# Patient Record
Sex: MALE | Race: WHITE | Employment: OTHER | URBAN - METROPOLITAN AREA
[De-identification: names, ages, dates, MRNs, and addresses within clinical notes are randomized per-mention and may not be internally consistent; named-entity substitution may affect disease eponyms.]

---

## 2017-02-15 NOTE — TELEPHONE ENCOUNTER
Pt called for eliquis refill on 1-10 via mailorder. Mail order has not received the refill order. Pt would like a 90 day supply thru mail order and a few pills to [facility] [phone]    Please call pt when refills are sent in.     Thanks

## 2017-02-16 NOTE — TELEPHONE ENCOUNTER
Patient is going out of town tomorrow and needs Eliquis sent to [facility] , and also to his mail order for 90 day supply .       Thanks     [employer]

## 2017-03-20 RX ORDER — DILTIAZEM HYDROCHLORIDE 180 MG/1
CAPSULE, COATED, EXTENDED RELEASE ORAL
Qty: 90 CAP | Refills: 2 | Status: SHIPPED | OUTPATIENT
Start: 2017-03-20 | End: 2018-01-15 | Stop reason: SDUPTHER

## 2017-04-07 ENCOUNTER — OFFICE VISIT (OUTPATIENT)
Dept: CARDIOLOGY CLINIC | Age: 72
End: 2017-04-07

## 2017-04-07 VITALS
RESPIRATION RATE: 16 BRPM | WEIGHT: 167.6 LBS | HEART RATE: 67 BPM | DIASTOLIC BLOOD PRESSURE: 60 MMHG | BODY MASS INDEX: 23.46 KG/M2 | OXYGEN SATURATION: 97 % | SYSTOLIC BLOOD PRESSURE: 118 MMHG | HEIGHT: 71 IN

## 2017-04-07 DIAGNOSIS — I48.92 ATRIAL FLUTTER, UNSPECIFIED TYPE (HCC): Chronic | ICD-10-CM

## 2017-04-07 DIAGNOSIS — I35.0 AORTIC STENOSIS, MILD: ICD-10-CM

## 2017-04-07 DIAGNOSIS — E78.2 MIXED HYPERLIPIDEMIA: ICD-10-CM

## 2017-04-07 DIAGNOSIS — I48.0 PAF (PAROXYSMAL ATRIAL FIBRILLATION) (HCC): Primary | ICD-10-CM

## 2017-04-07 NOTE — PROGRESS NOTES
NAME:  Tarun Pratt   :   1945   MRN:   [id number]   PCP:  Adebayo Mathias MD           Subjective: The patient is a 70y.o. year old male  who returns for a routine follow-up on PAF. Since the last visit, patient reports no change in exercise tolerance, chest pain, edema, medication intolerance, palpitations, shortness of breath, PND/orthopnea wheezing, sputum, syncope, dizziness or light headedness. Doing well. Works out 3 x a week without any complaints. Past Medical History:   Diagnosis Date    Anemia 2011    Arrhythmia     A-flutter    Atrial fibrillation (HCC)     Atrial flutter (HCC)     BPH (benign prostatic hyperplasia) 2011    Colon polyps 3/13/2015    Tubular adenomas    Essential hypertension     Hyperlipidemia 2011    Long term current use of anticoagulant therapy     Other acute and subacute form of ischemic heart disease     Other ill-defined conditions     fractured ribs     Palpitations        Social History   Substance Use Topics    Smoking status: Former Smoker     Quit date: 1985    Smokeless tobacco: Never Used    Alcohol use 7.0 oz/week     14 Glasses of wine per week      Family History   Problem Relation Age of Onset    Heart Disease Mother     Cancer Sister     Cancer Sister         Review of Systems  Constitutional: Negative for fever, chills, and diaphoresis. Respiratory: Negative for cough, hemoptysis, sputum production, shortness of breath and wheezing. Cardiovascular: Negative for chest pain, palpitations, orthopnea, claudication, leg swelling and PND. Gastrointestinal: Negative for heartburn, nausea, vomiting, blood in stool and melena. Genitourinary: Negative for dysuria and flank pain. Musculoskeletal: Negative for joint pain and back pain. Skin: Negative for rash. Neurological: Negative for focal weakness, seizures, loss of consciousness, weakness and headaches.    Endo/Heme/Allergies: Does not bruise/bleed easily. Psychiatric/Behavioral: Negative for memory loss. The patient does not have insomnia. Objective:       Vitals:    04/07/17 1005 04/07/17 1011   BP: 124/60 118/60   Pulse: 67    Resp: 16    SpO2: 97%    Weight: 167 lb 9.6 oz (76 kg)    Height: 5' 11\" (1.803 m)     Body mass index is 23.38 kg/(m^2). General PE    Gen: NAD     Mental Status - Alert. General Appearance - Not in acute distress. Neck - no JVD     Chest and Lung Exam     Inspection: Accessory muscles - No use of accessory muscles in breathing. Auscultation:   Breath sounds: - Normal.     Cardiovascular   Inspection: Jugular vein - Bilateral - Inspection Normal.   Palpation/Percussion:   Apical Impulse: - Normal.   Auscultation: Rhythm - Regular. Heart Sounds - S1 WNL and S2 WNL. No S3 or S4. Murmurs & Other Heart Sounds: Auscultation of the heart reveals - +MANSOOR     Peripheral Vascular   Upper Extremity: Inspection - Bilateral - No Cyanotic nailbeds or Digital clubbing. Lower Extremity:   Palpation: Edema - Bilateral - No edema. Abdomen: Soft, non-tender, bowel sounds are active. Neuro: A&O times 3, CN and motor grossly WNL      Data Review:     EKG -  Sinus  Rhythm   -Poor R-wave progression -nonspecific     Allergies reviewed  No Known Allergies    Medications reviewed  Current Outpatient Prescriptions   Medication Sig    dilTIAZem CD (CARDIZEM CD) 180 mg ER capsule TAKE 1 CAPSULE DAILY    apixaban (ELIQUIS) 5 mg tablet Take 1 Tab by mouth every twelve (12) hours. Replaces Xarelto 2/10/2016- please start after current Xarelto complete    flecainide (TAMBOCOR) 100 mg tablet TAKE 1 TABLET TWICE A DAY    losartan (COZAAR) 25 mg tablet TAKE 1 TABLET DAILY    atorvastatin (LIPITOR) 10 mg tablet TAKE 1 TABLET DAILY    montelukast (SINGULAIR) 10 mg tablet TAKE 1 TABLET DAILY    ciprofloxacin HCl (CIPRO) 500 mg tablet TAKE 1 TABLET BY MOUTH 2 TIMES A DAY.     alfuzosin SR (UROXATRAL) 10 mg SR tablet Take 10 mg by mouth daily.  tadalafil (CIALIS) 5 mg tablet Take 5 mg by mouth daily.  ergocalciferol, vitamin d2, (VITAMIN D) 400 unit Cap Take  by mouth.  coenzyme q10 (CO Q-10) 10 mg Cap Take  by mouth daily.  dutaseride (AVODART) 0.5 mg capsule Take 0.5 mg by mouth daily.  doxycycline (VIBRA-TABS) 100 mg tablet Take 1 Tab by mouth as needed. No current facility-administered medications for this visit. Assessment:       ICD-10-CM ICD-9-CM    1. PAF (paroxysmal atrial fibrillation) ([facility]) I48.0 427.31 AMB POC EKG ROUTINE W/ 12 LEADS, INTER & REP      LIPID PANEL      CK      METABOLIC PANEL, COMPREHENSIVE   2. Atrial flutter, unspecified type ([address] I48.92 427.32    3. Mixed hyperlipidemia E78.2 272.2 LIPID PANEL      CK      METABOLIC PANEL, COMPREHENSIVE   4. Normal coronary arteries Z03.89 V71.7    5. Aortic stenosis, mild I35.0 424.1         Orders Placed This Encounter    LIPID PANEL    CK    METABOLIC PANEL, COMPREHENSIVE    AMB POC EKG ROUTINE W/ 12 LEADS, INTER & REP     Order Specific Question:   Reason for Exam:     Answer:   routine           Plan:     Patient presents for follow up, feeling well and stable from cardiac standpoint. PAF:  ECG remains SR on Flecainide/ Eliquis.     Mild AS 7/15:  F/u 3 years later if no symptoms/ change in murmur.     BP is normotensive.      Lipids were at goal in March 2016, due now-ordered.       Exercising-congratulated and encouraged continuation.     Continue current care and f/u in 6 months.     Chelsey Ozuna MD

## 2017-04-07 NOTE — MR AVS SNAPSHOT
Visit Information Date & Time Provider Department Dept. Phone Encounter #  
 4/7/2017  9:45 AM Chelsey Ozuna, [address] [facility] [phone] Upcoming Health Maintenance Date Due Hepatitis C Screening 1945 MEDICARE YEARLY EXAM 10/3/2010 INFLUENZA AGE 9 TO ADULT 8/1/2016 Pneumococcal 65+ Low/Medium Risk (2 of 2 - PPSV23) 10/30/2016 COLONOSCOPY 3/9/2018 GLAUCOMA SCREENING Q2Y 3/31/2018 DTaP/Tdap/Td series (2 - Td) 7/19/2018 Allergies as of 4/7/2017  Review Complete On: 4/7/2017 By: Chelsey Ozuna MD  
 No Known Allergies Current Immunizations  Reviewed on 3/7/2012 Name Date Pneumococcal Conjugate (PCV-13) 10/30/2015 Pneumococcal Vaccine (Unspecified Type) 7/19/2010, 7/19/1997 TD Vaccine 7/19/2004 TDAP Vaccine 7/19/2008 Zoster 7/19/2007 Not reviewed this visit You Were Diagnosed With   
  
 Codes Comments PAF (paroxysmal atrial fibrillation) ([facility]    -  Primary ICD-10-CM: I48.0 ICD-9-CM: 427.31 At[facility] fl[facility], unspecified [facility] ([facility]     ICD-10-CM: I48.92 
ICD-9-CM: 427.32 Mixed hyperlipidemia     ICD-10-CM: E78.2 ICD-9-CM: 272.2 Vitals BP Pulse Resp Height(growth percentile) Weight(growth percentile) SpO2  
 118/60 (BP 1 Location: Right arm, BP Patient Position: Sitting) 67 16 5' 11\" (1.803 m) 167 lb 9.6 oz (76 kg) 97% BMI Smoking Status 23.38 kg/m2 Former Smoker Vitals History BMI and BSA Data Body Mass Index Body Surface Area  
 23.38 kg/m 2 1.95 m 2 Preferred Pharmacy Pharmacy Name Phone  [address] [phone] Your Updated Medication List  
  
   
This list is accurate as of: 4/7/17 10:39 AM.  Always use your most recent med list.  
  
  
  
  
 alfuzosin SR 10 mg SR tablet Commonly known as:  Cindy Chang Take 10 mg by mouth daily. apixaban 5 mg tablet Commonly known as:  Pastshaolnda Ramal Take 1 Tab by mouth every twelve (12) hours. Replaces Xarelto 2/10/2016- please start after current Xarelto complete  
  
 atorvastatin 10 mg tablet Commonly known as:  LIPITOR  
TAKE 1 TABLET DAILY  
  
 AVODART 0.5 mg capsule Generic drug:  dutasteride Take 0.5 mg by mouth daily. CIALIS 5 mg tablet Generic drug:  tadalafil Take 5 mg by mouth daily. ciprofloxacin HCl 500 mg tablet Commonly known as:  CIPRO TAKE 1 TABLET BY MOUTH 2 TIMES A DAY. CO Q-10 10 mg Cap Generic drug:  coenzyme q10 Take  by mouth daily. dilTIAZem  mg ER capsule Commonly known as:  CARDIZEM CD  
TAKE 1 CAPSULE DAILY  
  
 doxycycline 100 mg tablet Commonly known as:  VIBRA-TABS Take 1 Tab by mouth as needed. flecainide 100 mg tablet Commonly known as:  TAMBOCOR  
TAKE 1 TABLET TWICE A DAY  
  
 losartan 25 mg tablet Commonly known as:  COZAAR  
TAKE 1 TABLET DAILY  
  
 montelukast 10 mg tablet Commonly known as:  SINGULAIR  
TAKE 1 TABLET DAILY  
  
 VITAMIN D2 400 unit Cap Generic drug:  ergocalciferol (vitamin d2) Take  by mouth. We Performed the Following AMB POC EKG ROUTINE W/ 12 LEADS, INTER & REP [06981 CPT(R)] CK [id number] CPT(R)] LIPID PANEL [47284 CPT(R)] METABOLIC PANEL, COMPREHENSIVE [89733 CPT(R)] Introducing [facility]! Johnna Gaston introduces [employer] patient portal. Now you can access parts of your medical record, email your doctor's office, and request medication refills online. 1. In your internet browser, go to [email or web address] 2. Click on the First Time User? Click Here link in the Sign In box. You will see the New Member Sign Up page. 3. Enter your [employer] Access Code exactly as it appears below. You will not need to use this code after youve completed the sign-up process. If you do not sign up before the expiration date, you must request a new code. · [employer] Access Code: [email or web address] Expires: 7/6/2017 10:39 AM 
 
4. Enter the last four digits of your Social Security Number (xxxx) and Date of Birth (mm/dd/yyyy) as indicated and click Submit. You will be taken to the next sign-up page. 5. Create a [employer] ID. This will be your [employer] login ID and cannot be changed, so think of one that is secure and easy to remember. 6. Create a [employer] password. You can change your password at any time. 7. Enter your Password Reset Question and Answer. This can be used at a later time if you forget your password. 8. Enter your e-mail address. You will receive e-mail notification when new information is available in [address] 9. Click Sign Up. You can now view and download portions of your medical record. 10. Click the Download Summary menu link to download a portable copy of your medical information. If you have questions, please visit the Frequently Asked Questions section of the [employer] website. Remember, [employer] is NOT to be used for urgent needs. For medical emergencies, dial 911. Now available from your iPhone and Android! Please provide this summary of care documentation to your next provider. Your primary care clinician is listed as LEONARDO Aaron. If you have any questions after today's visit, please call [phone].

## 2017-06-06 ENCOUNTER — TELEPHONE (OUTPATIENT)
Dept: CARDIOLOGY CLINIC | Age: 72
End: 2017-06-06

## 2017-06-06 NOTE — TELEPHONE ENCOUNTER
[facility] calling requesting cardiac clearance and holding blood thinner prior to procedure please advise thanks.  Fax # [phone] Att: Polina Kidd with  Dr Pao Dorsey

## 2017-06-09 ENCOUNTER — TELEPHONE (OUTPATIENT)
Dept: CARDIOLOGY CLINIC | Age: 72
End: 2017-06-09

## 2017-06-09 RX ORDER — FLECAINIDE ACETATE 100 MG/1
TABLET ORAL
Qty: 180 TAB | Refills: 3 | Status: SHIPPED | OUTPATIENT
Start: 2017-06-09 | End: 2018-04-26 | Stop reason: SDUPTHER

## 2017-06-09 NOTE — TELEPHONE ENCOUNTER
Patient needs a refill for his mail order flecainide 100 mg.  He asked if you could call him,    Thanks

## 2017-06-22 RX ORDER — ATORVASTATIN CALCIUM 10 MG/1
TABLET, FILM COATED ORAL
Qty: 90 TAB | Refills: 2 | Status: SHIPPED | OUTPATIENT
Start: 2017-06-22 | End: 2018-03-24 | Stop reason: SDUPTHER

## 2017-08-31 RX ORDER — LOSARTAN POTASSIUM 25 MG/1
TABLET ORAL
Qty: 90 TAB | Refills: 3 | Status: SHIPPED | OUTPATIENT
Start: 2017-08-31 | End: 2018-08-29 | Stop reason: SDUPTHER

## 2017-11-16 RX ORDER — APIXABAN 5 MG/1
TABLET, FILM COATED ORAL
Qty: 180 TAB | Refills: 2 | Status: SHIPPED | OUTPATIENT
Start: 2017-11-16 | End: 2017-11-20 | Stop reason: SDUPTHER

## 2017-11-20 ENCOUNTER — OFFICE VISIT (OUTPATIENT)
Dept: CARDIOLOGY CLINIC | Age: 72
End: 2017-11-20

## 2017-11-20 VITALS
BODY MASS INDEX: 23.38 KG/M2 | SYSTOLIC BLOOD PRESSURE: 128 MMHG | DIASTOLIC BLOOD PRESSURE: 62 MMHG | HEART RATE: 94 BPM | RESPIRATION RATE: 18 BRPM | OXYGEN SATURATION: 97 % | WEIGHT: 167 LBS | HEIGHT: 71 IN

## 2017-11-20 DIAGNOSIS — I35.0 AORTIC STENOSIS, MILD: ICD-10-CM

## 2017-11-20 DIAGNOSIS — I48.92 ATRIAL FLUTTER, UNSPECIFIED TYPE (HCC): Primary | Chronic | ICD-10-CM

## 2017-11-20 DIAGNOSIS — E78.2 MIXED HYPERLIPIDEMIA: ICD-10-CM

## 2017-11-20 NOTE — MR AVS SNAPSHOT
Visit Information Date & Time Provider Department Dept. Phone Encounter #  
 11/20/2017 11:00 AM Kayleigh Contreras, [address] [facility] [phone] [id number] Upcoming Health Maintenance Date Due Hepatitis C Screening 1945 MEDICARE YEARLY EXAM 10/3/2010 Pneumococcal 65+ Low/Medium Risk (2 of 2 - PPSV23) 10/30/2016 COLONOSCOPY 3/9/2018 GLAUCOMA SCREENING Q2Y 3/31/2018 DTaP/Tdap/Td series (2 - Td) 7/19/2018 Allergies as of 11/20/2017  Review Complete On: 11/20/2017 By: Kayleigh Contreras MD  
 No Known Allergies Current Immunizations  Reviewed on 3/7/2012 Name Date Influenza High Dose Vaccine PF 10/2/2017 Pneumococcal Conjugate (PCV-13) 10/30/2015 TD Vaccine 7/19/2004 TDAP Vaccine 7/19/2008 ZZZ-RETIRED (DO NOT USE) Pneumococcal Vaccine (Unspecified Type) 7/19/2010, 7/19/1997 Zoster 7/19/2007 Not reviewed this visit You Were Diagnosed With   
  
 Codes Comments Atrial flutter, unspecified type ([facility]    -  Primary ICD-10-CM: I48.92 
ICD-9-CM: 427.32 Mixed hyperlipidemia     ICD-10-CM: E78.2 ICD-9-CM: 272.2 Aortic stenosis, mild     ICD-10-CM: I35.0 ICD-9-CM: 424.1 Normal coronary arteries     ICD-10-CM: Z03.89 ICD-9-CM: V71.7 Vitals BP Pulse Resp Height(growth percentile) Weight(growth percentile) SpO2  
 128/62 (BP 1 Location: Right arm, BP Patient Position: Standing) 94 18 5' 11\" (1.803 m) 167 lb (75.8 kg) 97% BMI Smoking Status 23.29 kg/m2 Former Smoker Vitals History BMI and BSA Data Body Mass Index Body Surface Area  
 23.29 kg/m 2 1.95 m 2 Preferred Pharmacy Pharmacy Name Phone  [address] [phone] Your Updated Medication List  
  
   
This list is accurate as of: 11/20/17 11:52 AM.  Always use your most recent med list.  
  
  
  
  
 alfuzosin SR 10 mg SR tablet Commonly known as:  Cosimo Skipper Take 10 mg by mouth daily. atorvastatin 10 mg tablet Commonly known as:  LIPITOR  
TAKE 1 TABLET DAILY  
  
 AVODART 0.5 mg capsule Generic drug:  dutasteride Take 0.5 mg by mouth daily. CIALIS 5 mg tablet Generic drug:  tadalafil Take 5 mg by mouth daily. ciprofloxacin HCl 500 mg tablet Commonly known as:  CIPRO TAKE 1 TABLET BY MOUTH 2 TIMES A DAY. CO Q-10 10 mg Cap Generic drug:  coenzyme q10 Take  by mouth daily. dilTIAZem  mg ER capsule Commonly known as:  CARDIZEM CD  
TAKE 1 CAPSULE DAILY  
  
 doxycycline 100 mg tablet Commonly known as:  VIBRA-TABS Take 1 Tab by mouth as needed. ELIQUIS 5 mg tablet Generic drug:  apixaban FOR DIRECTIONS ON HOW TO   TAKE THIS MEDICINE, READ   THE ENCLOSED MEDICATION    INFORMATION FORM  
  
 flecainide 100 mg tablet Commonly known as:  TAMBOCOR  
TAKE 1 TABLET TWICE A DAY  
  
 losartan 25 mg tablet Commonly known as:  COZAAR  
TAKE 1 TABLET DAILY  
  
 montelukast 10 mg tablet Commonly known as:  SINGULAIR  
TAKE 1 TABLET DAILY  
  
 VITAMIN D2 400 unit Cap Generic drug:  ergocalciferol (vitamin d2) Take  by mouth. We Performed the Following AMB POC EKG ROUTINE W/ 12 LEADS, INTER & REP [18851 CPT(R)] To-Do List   
 05/20/2018 ECHO:  2D ECHO COMPLETE ADULT (TTE) W OR WO CONTR Introducing [address]! [employer] [facility] introduces [employer] patient portal. Now you can access parts of your medical record, email your doctor's office, and request medication refills online. 1. In your internet browser, go to [email or web address] 2. Click on the First Time User? Click Here link in the Sign In box. You will see the New Member Sign Up page. 3. Enter your [employer] Access Code exactly as it appears below. You will not need to use this code after youve completed the sign-up process.  If you do not sign up before the expiration date, you must request a new code. · [employer] Access Code: [email or web address] Expires: 12/31/2017 10:22 AM 
 
4. Enter the last four digits of your Social Security Number (xxxx) and Date of Birth (mm/dd/yyyy) as indicated and click Submit. You will be taken to the next sign-up page. 5. Create a [employer] ID. This will be your [employer] login ID and cannot be changed, so think of one that is secure and easy to remember. 6. Create a [employer] password. You can change your password at any time. 7. Enter your Password Reset Question and Answer. This can be used at a later time if you forget your password. 8. Enter your e-mail address. You will receive e-mail notification when new information is available in [address] 9. Click Sign Up. You can now view and download portions of your medical record. 10. Click the Download Summary menu link to download a portable copy of your medical information. If you have questions, please visit the Frequently Asked Questions section of the [employer] website. Remember, [employer] is NOT to be used for urgent needs. For medical emergencies, dial 911. Now available from your iPhone and Android! Please provide this summary of care documentation to your next provider. Your primary care clinician is listed as LEONARDO Sampson. If you have any questions after today's visit, please call [phone].

## 2017-11-20 NOTE — PROGRESS NOTES
1. Have you been to the ER, urgent care clinic since your last visit? Hospitalized since your last visit? YES, AT [place] [address]. 2. Have you seen or consulted any other health care providers outside of the [address] [place] [address] since your last visit? Include any pap smears or colon screening. YES, PCP DR. RICHARDSON. C/O DIZZINESS.

## 2017-11-20 NOTE — PROGRESS NOTES
Alison Samayoa DNP, ANP-BC  Subjective/HPI:     Yahir Black is a 67 y.o. male is here for routine f/u. The patient denies chest pain/ shortness of breath, orthopnea, PND, LE edema, palpitations, syncope, presyncope or fatigue. Patient reports feeling well in his usual state of health with the exception of very slight dizziness with a quick positional change during laying flat and doing core exercises.     Patient Active Problem List   Diagnosis Code    Hyperlipidemia E78.5    BPH (benign prostatic hyperplasia) N40.0    Anemia D64.9    Hallux valgus (acquired) M20.10    Atrial fibrillation with RVR (HCC) I48.91    Normal coronary arteries Z03.89    Atrial flutter (HCC) I48.92    Atrial fibrillation (HCC) I48.91    Mixed hyperlipidemia E78.2    Colon polyps K63.5    Mitral regurgitation I34.0    PAF (paroxysmal atrial fibrillation) (HCC) I48.0    Aortic stenosis, mild I35.0    Irregular heart beat I49.9       PCP Provider  Agustín Pat MD  Past Medical History:   Diagnosis Date    Anemia 7/20/2011    Arrhythmia     A-flutter    Atrial fibrillation (HCC)     Atrial flutter (HCC)     BPH (benign prostatic hyperplasia) 7/19/2011    Colon polyps 3/13/2015    Tubular adenomas    Essential hypertension     Hyperlipidemia 7/19/2011    Long term current use of anticoagulant therapy     Other acute and subacute form of ischemic heart disease     Other ill-defined conditions(799.89) 1996    fractured ribs     Palpitations       Past Surgical History:   Procedure Laterality Date    CARDIAC CATHETERIZATION  2/2/2012         CARDIAC SURG PROCEDURE UNLIST      cardiac cath    HX HERNIA REPAIR  12/07/2016    [facility] WITH MESH    HX ORTHOPAEDIC  2011    left bunionectomy and hammertoe repair    HX OTHER SURGICAL      cardiac ablation 3/7/12    HX TONSILLECTOMY       No Known Allergies   Family History   Problem Relation Age of Onset    Heart Disease Mother     Cancer Sister     Cancer Sister       Current Outpatient Prescriptions   Medication Sig    ELIQUIS 5 mg tablet FOR DIRECTIONS ON HOW TO   TAKE THIS MEDICINE, READ   THE ENCLOSED MEDICATION    INFORMATION FORM    losartan (COZAAR) 25 mg tablet TAKE 1 TABLET DAILY    montelukast (SINGULAIR) 10 mg tablet TAKE 1 TABLET DAILY    atorvastatin (LIPITOR) 10 mg tablet TAKE 1 TABLET DAILY    flecainide (TAMBOCOR) 100 mg tablet TAKE 1 TABLET TWICE A DAY    dilTIAZem CD (CARDIZEM CD) 180 mg ER capsule TAKE 1 CAPSULE DAILY    tadalafil (CIALIS) 5 mg tablet Take 5 mg by mouth daily.  ergocalciferol, vitamin d2, (VITAMIN D) 400 unit Cap Take  by mouth.  coenzyme q10 (CO Q-10) 10 mg Cap Take  by mouth daily.  ciprofloxacin HCl (CIPRO) 500 mg tablet TAKE 1 TABLET BY MOUTH 2 TIMES A DAY.  alfuzosin SR (UROXATRAL) 10 mg SR tablet Take 10 mg by mouth daily.  doxycycline (VIBRA-TABS) 100 mg tablet Take 1 Tab by mouth as needed.  dutaseride (AVODART) 0.5 mg capsule Take 0.5 mg by mouth daily. No current facility-administered medications for this visit. Vitals:    11/20/17 1058 11/20/17 1105 11/20/17 1106   BP: 140/78 140/62 128/62   Pulse: 94     Resp: 18     SpO2: 97%     Weight: 167 lb (75.8 kg)     Height: 5' 11\" (1.803 m)       Social History     Social History    Marital status:      Spouse name: N/A    Number of children: N/A    Years of education: N/A     Occupational History    Not on file. Social History Main Topics    Smoking status: Former Smoker     Quit date: 2/2/1985    Smokeless tobacco: Never Used    Alcohol use 7.0 oz/week     14 Glasses of wine per week    Drug use: No    Sexual activity: Not on file     Other Topics Concern    Not on file     Social History Narrative       I have reviewed the nurses notes, vitals, problem list, allergy list, medical history, family, social history and medications. Review of Symptoms:    General: Pt denies excessive weight gain or loss.  Pt is able to conduct ADL's  HEENT: Denies blurred vision, headaches, epistaxis and difficulty swallowing. Respiratory: Denies shortness of breath, MORALES, wheezing or stridor. Cardiovascular: Denies precordial pain, palpitations, edema or PND  Gastrointestinal: Denies poor appetite, indigestion, abdominal pain or blood in stool  Musculoskeletal: Denies pain or swelling from muscles or joints  Neurologic: Denies tremor, paresthesias, or sensory motor disturbance  Skin: Denies rash, itching or texture change. Physical Exam:      General: Well developed, in no acute distress, cooperative and alert  HEENT: No carotid bruits, no JVD, trach is midline. Neck Supple, PEERL, EOM intact. Heart:  Normal S1/S2 negative S3 or S4. Regular, 2/6 systolic no murmur, gallop or rub.   Respiratory: Clear bilaterally x 4, no wheezing or rales  Abdomen:   Soft, non-tender, no masses, bowel sounds are active.   Extremities:  No edema, normal cap refill, no cyanosis, atraumatic. Neuro: A&Ox3, speech clear, gait stable. Skin: Skin color is normal. No rashes or lesions.  Non diaphoretic  Vascular: 2+ pulses symmetric in all extremities    Cardiographics    ECG: Sinus rhythm  Results for orders placed or performed during the hospital encounter of 03/07/12   EKG, 12 LEAD, INITIAL   Result Value Ref Range    Ventricular Rate 74 BPM    Atrial Rate 74 BPM    P-R Interval 188 ms    QRS Duration 92 ms    Q-T Interval 390 ms    QTC Calculation (Bezet) 432 ms    Calculated P Axis 78 degrees    Calculated R Axis 20 degrees    Calculated T Axis 54 degrees    Diagnosis       Normal sinus rhythm  Possible Left atrial enlargement  When compared with ECG of 09-JAN-2012 10:23,  Criteria for Anterior infarct are no longer present  T wave amplitude has decreased in Anterior leads  Confirmed by Valarie Santana, P.VAyaan ([id number]) on 3/8/2012 11:04:13 AM         Cardiology Labs:  Lab Results   Component Value Date/Time    Cholesterol, total 158 11/29/2012 09:03 AM    HDL Cholesterol 71 11/29/2012 09:03 AM    LDL, calculated 77 11/29/2012 09:03 AM    Triglyceride 52 11/29/2012 09:03 AM       Lab Results   Component Value Date/Time    Sodium 132 04/21/2017 09:15 AM    Potassium 4.6 04/21/2017 09:15 AM    Chloride 92 04/21/2017 09:15 AM    CO2 25 04/21/2017 09:15 AM    Anion gap 7 12/05/2016 08:31 AM    Glucose 88 04/21/2017 09:15 AM    BUN 15 04/21/2017 09:15 AM    Creatinine 0.87 04/21/2017 09:15 AM    BUN/Creatinine ratio 17 04/21/2017 09:15 AM    GFR est  04/21/2017 09:15 AM    GFR est non-AA 87 04/21/2017 09:15 AM    Calcium 9.6 04/21/2017 09:15 AM    Bilirubin, total 0.5 04/21/2017 09:15 AM    AST (SGOT) 28 04/21/2017 09:15 AM    Alk. phosphatase 34 04/21/2017 09:15 AM    Protein, total 6.5 04/21/2017 09:15 AM    Albumin 4.5 04/21/2017 09:15 AM    Globulin 3.3 01/09/2012 07:50 AM    A-G Ratio 2.3 04/21/2017 09:15 AM    ALT (SGPT) 20 04/21/2017 09:15 AM           Assessment:     Assessment:     Diagnoses and all orders for this visit:    1. Atrial flutter, unspecified type (HCC)  -     AMB POC EKG ROUTINE W/ 12 LEADS, INTER & REP  -     2D ECHO COMPLETE ADULT (TTE) W OR WO CONTR; Future    2. Mixed hyperlipidemia  -     2D ECHO COMPLETE ADULT (TTE) W OR WO CONTR; Future    3. Aortic stenosis, mild  -     2D ECHO COMPLETE ADULT (TTE) W OR WO CONTR; Future    4. Normal coronary arteries  -     2D ECHO COMPLETE ADULT (TTE) W OR WO CONTR; Future        ICD-10-CM ICD-9-CM    1. Atrial flutter, unspecified type (HCC) I48.92 427.32 AMB POC EKG ROUTINE W/ 12 LEADS, INTER & REP      2D ECHO COMPLETE ADULT (TTE) W OR WO CONTR   2. Mixed hyperlipidemia E78.2 272.2 2D ECHO COMPLETE ADULT (TTE) W OR WO CONTR   3. Aortic stenosis, mild I35.0 424.1 2D ECHO COMPLETE ADULT (TTE) W OR WO CONTR   4.  Normal coronary arteries Z03.89 V71.7 2D ECHO COMPLETE ADULT (TTE) W OR WO CONTR     Orders Placed This Encounter    AMB POC EKG ROUTINE W/ 12 LEADS, INTER & REP     Order Specific Question: Reason for Exam:     Answer:   routine    2D ECHO COMPLETE ADULT (TTE) W OR WO CONTR     Standing Status:   Future     Standing Expiration Date:   5/20/2018     Order Specific Question:   Reason for Exam:     Answer:   A/S     Order Specific Question:   Contrast Enhancement (Bubble Study, Definity, Optison) may be used if criteria listed in established evidence-based protocol has been identified. Answer:   Yes        Plan:     Patient presents doing well and is stable from cardiac stand point. 1.  History of atrial fib/flutter: Maintaining sinus rhythm with anti-arrhythmic, continue Eliquis. 2.  Hyperlipidemia: Reviewed lipid panel from primary care, elevated HDL, total cholesterol less than 150, LDL not calculated  3. Aortic stenosis: Mild, will perform echocardiogram in 2018 for surveillance (arrange at next visit). Continue current care and f/u in 6 months. Rahul Stout MD    This note was created using voice recognition software. Despite editing, there may be syntax errors.

## 2018-01-15 RX ORDER — DILTIAZEM HYDROCHLORIDE 180 MG/1
CAPSULE, COATED, EXTENDED RELEASE ORAL
Qty: 90 CAP | Refills: 2 | Status: SHIPPED | OUTPATIENT
Start: 2018-01-15 | End: 2019-04-05 | Stop reason: SDUPTHER

## 2018-03-26 RX ORDER — ATORVASTATIN CALCIUM 10 MG/1
TABLET, FILM COATED ORAL
Qty: 90 TAB | Refills: 2 | Status: SHIPPED | OUTPATIENT
Start: 2018-03-26 | End: 2018-12-08 | Stop reason: SDUPTHER

## 2018-04-26 RX ORDER — FLECAINIDE ACETATE 100 MG/1
TABLET ORAL
Qty: 180 TAB | Refills: 3 | Status: SHIPPED | OUTPATIENT
Start: 2018-04-26 | End: 2020-02-06 | Stop reason: SDUPTHER

## 2018-05-16 ENCOUNTER — TELEPHONE (OUTPATIENT)
Dept: CARDIOLOGY CLINIC | Age: 73
End: 2018-05-16

## 2018-05-16 NOTE — TELEPHONE ENCOUNTER
Patient has colonoscopy scheduled 5-21-18 and has been asked to hold Eliquis 2 days prior LV 11-20-17 next visit 5-24-18 please advise thanks

## 2018-05-24 ENCOUNTER — OFFICE VISIT (OUTPATIENT)
Dept: CARDIOLOGY CLINIC | Age: 73
End: 2018-05-24

## 2018-05-24 VITALS
HEIGHT: 71 IN | DIASTOLIC BLOOD PRESSURE: 70 MMHG | SYSTOLIC BLOOD PRESSURE: 130 MMHG | WEIGHT: 167.6 LBS | HEART RATE: 60 BPM | OXYGEN SATURATION: 98 % | BODY MASS INDEX: 23.46 KG/M2 | RESPIRATION RATE: 16 BRPM

## 2018-05-24 DIAGNOSIS — I48.0 PAF (PAROXYSMAL ATRIAL FIBRILLATION) (HCC): Primary | ICD-10-CM

## 2018-05-24 DIAGNOSIS — I35.0 AORTIC STENOSIS, MILD: ICD-10-CM

## 2018-05-24 DIAGNOSIS — E78.2 MIXED HYPERLIPIDEMIA: ICD-10-CM

## 2018-05-24 NOTE — MR AVS SNAPSHOT
[facility] 
[phone] Patient: Oracio Curtis MRN: [id number] PAMELA:[id number] Visit Information Date & Time Provider Department Dept. Phone Encounter #  
 5/24/2018  9:15 AM Pallavi Thompson, [address] [place] [address] [facility] [phone] [id number] Your Appointments 5/31/2018 10:00 AM  
ECHO CARDIOGRAMS 2D with ECHO, [facility] Appt Note: Dr Triston Nix (TTE) W OR WO CONTR [[id number]] (Order [id number]) ,[address] [place] [address]  
[phone] [address] [place] [address]  
  
    
 11/12/2018  9:00 AM  
ESTABLISHED PATIENT with Pallavi Thompson MD  
[facility] [address]  
[phone] [address] [place] [address] Upcoming Health Maintenance Date Due Hepatitis C Screening 1945 Pneumococcal 65+ Low/Medium Risk (2 of 2 - PPSV23) 10/30/2016 MEDICARE YEARLY EXAM 3/14/2018 GLAUCOMA SCREENING Q2Y 3/31/2018 DTaP/Tdap/Td series (2 - Td) 7/19/2018 Influenza Age 5 to Adult 8/1/2018 Allergies as of 5/24/2018  Review Complete On: 5/24/2018 By: Pallavi Thompson MD  
 No Known Allergies Current Immunizations  Reviewed on 3/7/2012 Name Date Influenza High Dose Vaccine PF 10/2/2017 Pneumococcal Conjugate (PCV-13) 10/30/2015 TD Vaccine 7/19/2004 TDAP Vaccine 7/19/2008 ZZZ-RETIRED (DO NOT USE) Pneumococcal Vaccine (Unspecified Type) 7/19/2010, 7/19/1997 Zoster 7/19/2007 Not reviewed this visit You Were Diagnosed With   
  
 Codes Comments PAF [facility]paroxysmal atrial fibrillation) ([facility]    -  Primary ICD-10-CM: I48.0 ICD-9-CM: 427.31 Aortic stenosis, mild     ICD-10-CM: I35.0 ICD-9-CM: 424.1 Mixed hyperlipidemia     ICD-10-CM: E78.2 ICD-9-CM: 272.2 Normal coronary arteries     ICD-10-CM: Z03.89 ICD-9-CM: V71.7 Vitals BP Pulse Resp Height(growth percentile) Weight(growth percentile) SpO2  
 130/70 (BP Patient Position: Sitting) 60 16 5' 11\" (1.803 m) 167 lb 9.6 oz (76 kg) 98% BMI Smoking Status 23.38 kg/m2 Former Smoker BMI and BSA Data Body Mass Index Body Surface Area  
 23.38 kg/m 2 1.95 m 2 Preferred Pharmacy Pharmacy Name Phone  [address] [phone] Your Updated Medication List  
  
   
This list is accurate as of 5/24/18 10:05 AM.  Always use your most recent med list.  
  
  
  
  
 apixaban 5 mg tablet Commonly known as:  Kike Rubio Take 1 Tab by mouth two (2) times a day. atorvastatin 10 mg tablet Commonly known as:  LIPITOR  
TAKE 1 TABLET DAILY CIALIS 5 mg tablet Generic drug:  tadalafil Take 5 mg by mouth daily. CO Q-10 10 mg Cap Generic drug:  coenzyme q10 Take  by mouth daily. dilTIAZem  mg ER capsule Commonly known as:  CARDIZEM CD  
TAKE 1 CAPSULE DAILY. flecainide 100 mg tablet Commonly known as:  TAMBOCOR  
TAKE 1 TABLET TWICE A DAY  
  
 losartan 25 mg tablet Commonly known as:  COZAAR  
TAKE 1 TABLET DAILY  
  
 montelukast 10 mg tablet Commonly known as:  SINGULAIR  
TAKE 1 TABLET DAILY  
  
 VITAMIN D2 400 unit Cap Generic drug:  ergocalciferol (vitamin d2) Take  by mouth. We Performed the Following AMB POC EKG ROUTINE W/ 12 LEADS, INTER & REP [98578 CPT(R)] To-Do List   
 05/24/2018 ECHO:  2D ECHO COMPLETE ADULT (TTE) W OR WO CONTR Introducing \[facility]\"" & HEALTH SERVICES! [employer] introduces [employer] patient portal. Now you can access parts of your medical record, email your doctor's office, and request medication refills online.    
 
1. In your internet browser, go to [email or web address] [employer] 2. Click on the First Time User? Click Here link in the Sign In box. You will see the New Member Sign Up page. 3. Enter your In[employer] Access Code exactly as it appears below. You will not need to use this code after youve completed the sign-up process. If you do not sign up before the expiration date, you must request a new code. · [employer] Access Code: [email or web address] Expires: 8/22/2018 10:02 AM 
 
4. Enter the last four digits of your Social Security Number (xxxx) and Date of Birth (mm/dd/yyyy) as indicated and click Submit. You will be taken to the next sign-up page. 5. Create a [employer] ID. This will be your InfluxDB login ID and cannot be changed, so think of one that is secure and easy to remember. 6. Create a In[employer] password. You can change your password at any time. 7. Enter your Password Reset Question and Answer. This can be used at a later time if you forget your password. 8. Enter your e-mail address. You will receive e-mail notification when new information is available in [address] 9. Click Sign Up. You can now view and download portions of your medical record. 10. Click the Download Summary menu link to download a portable copy of your medical information. If you have questions, please visit the Frequently Asked Questions section of the [employer] website. Remember, In[employer] is NOT to be used for urgent needs. For medical emergencies, dial 911. Now available from your iPhone and Android! Please provide this summary of care documentation to your next provider. Your primary care clinician is listed as LEONARDO Vasquez. If you have any questions after today's visit, please call [phone].

## 2018-05-24 NOTE — PROGRESS NOTES
1. Have you been to the ER, urgent care clinic since your last visit? Hospitalized since your last visit? Yes When: 5/2018 Colonoscopy @ [facility]    2. Have you seen or consulted any other health care providers outside of the [facility] since your last visit? Include any pap smears or colon screening. Yes Urology 3/2018    Patient has no cardiac complaints.

## 2018-05-24 NOTE — PROGRESS NOTES
[facility], [address] [place] [address]  [phone]     Subjective:      Lasha Rae is a 67 y.o. male is here for routine f/u. The patient denies chest pain/ shortness of breath, orthopnea, PND, LE edema, palpitations, syncope, or presyncope.        Patient Active Problem List    Diagnosis Date Noted    Irregular heart beat 09/21/2016    PAF (paroxysmal atrial fibrillation) (HCC) 02/25/2016    Aortic stenosis, mild 02/25/2016    Mitral regurgitation 07/06/2015    Colon polyps 03/13/2015    Atrial fibrillation ([address] 05/17/2012    Mixed hyperlipidemia 05/17/2012    Atrial flutter ([address] 03/06/2012    Normal coronary arteries 02/02/2012    Atrial fibrillation with RVR ([address] 01/09/2012    Hallux valgus (acquired) 01/05/2012    Anemia 07/20/2011    Hyperlipidemia 07/19/2011    BPH (benign prostatic hyperplasia) 07/19/2011      C Thai Rodgers MD  Past Medical History:   Diagnosis Date    Anemia 7/20/2011    Arrhythmia     A-flutter    Atrial fibrillation (HCC)     Atrial flutter (HCC)     BPH (benign prostatic hyperplasia) 7/19/2011    Colon polyps 3/13/2015    Tubular adenomas    Essential hypertension     Hyperlipidemia 7/19/2011    Long term current use of anticoagulant therapy     Other acute and subacute form of ischemic heart disease     Other ill-defined conditions(799.89) 1996    fractured ribs     Palpitations       Past Surgical History:   Procedure Laterality Date    CARDIAC CATHETERIZATION  2/2/2012         CARDIAC SURG PROCEDURE UNLIST      cardiac cath    HX [facility]PA[facility]  [facility]    HX ORTHOPAEDIC  2011    left bunionectomy and hammertoe repair    HX OTHER SURGICAL      cardiac ablation 3/7/12    HX TONSILLECTOMY       No Known Allergies   Family History   Problem Relation Age of Onset    Heart Disease Mother     Cancer Sister     Cancer Sister       Social History     Social History    Marital status:      Spouse name: N/A    Number of children: N/A    Years of education: N/A     Occupational History    Not on file. Social History Main Topics    Smoking status: Former Smoker     Quit date: 2/2/1985    Smokeless tobacco: Never Used    Alcohol use 7.0 oz/week     14 Glasses of wine per week    Drug use: No    Sexual activity: Not on file     Other Topics Concern    Not on file     Social History Narrative      Current Outpatient Prescriptions   Medication Sig    flecainide (TAMBOCOR) 100 mg tablet TAKE 1 TABLET TWICE A DAY    atorvastatin (LIPITOR) 10 mg tablet TAKE 1 TABLET DAILY    dilTIAZem CD (CARDIZEM CD) 180 mg ER capsule TAKE 1 CAPSULE DAILY.  apixaban (ELIQUIS) 5 mg tablet Take 1 Tab by mouth two (2) times a day.  losartan (COZAAR) 25 mg tablet TAKE 1 TABLET DAILY    montelukast (SINGULAIR) 10 mg tablet TAKE 1 TABLET DAILY    tadalafil (CIALIS) 5 mg tablet Take 5 mg by mouth daily.  ergocalciferol, vitamin d2, (VITAMIN D) 400 unit Cap Take  by mouth.  coenzyme q10 (CO Q-10) 10 mg Cap Take  by mouth daily. No current facility-administered medications for this visit. Review of Symptoms:  11 systems reviewed, negative other than as stated in the HPI    Physical ExamPhysical Exam:    Vitals:    05/24/18 0950   BP: 130/70   Pulse: 60   Resp: 16   SpO2: 98%   Weight: 167 lb 9.6 oz (76 kg)   Height: 5' 11\" (1.803 m)     Body mass index is 23.38 kg/(m^2). General PE   Gen:  NAD  Mental Status - Alert. General Appearance - Not in acute distress. Chest and Lung Exam   Inspection: Accessory muscles - No use of accessory muscles in breathing. Auscultation:   Breath sounds: - Normal.   Cardiovascular   Inspection: Jugular vein - Bilateral - Inspection Normal.   Palpation/Percussion:   Apical Impulse: - Normal.   Auscultation: Rhythm - Regular. Heart Sounds - S1 WNL and S2 WNL. No S3 or S4. Murmurs & Other Heart Sounds: Auscultation of the heart reveals - No Murmurs.    Peripheral Vascular   Upper Extremity: Inspection - Bilateral - No Cyanotic nailbeds or Digital clubbing. Lower Extremity:   Palpation: Edema - Bilateral - No edema. Abdomen:   Soft, non-tender, bowel sounds are active. Neuro: A&O times 3, CN and motor grossly WNL    Labs:   Lab Results   Component Value Date/Time    Cholesterol, total 158 11/29/2012 09:03 AM    HDL Cholesterol 71 11/29/2012 09:03 AM    LDL, calculated 77 11/29/2012 09:03 AM    Triglyceride 52 11/29/2012 09:03 AM     Lab Results   Component Value Date/Time     01/09/2012 07:50 AM     Lab Results   Component Value Date/Time    Sodium 132 (L) 04/21/2017 09:15 AM    Potassium 4.6 04/21/2017 09:15 AM    Chloride 92 (L) 04/21/2017 09:15 AM    CO2 25 04/21/2017 09:15 AM    Anion gap 7 12/05/2016 08:31 AM    Glucose 88 04/21/2017 09:15 AM    BUN 15 04/21/2017 09:15 AM    Creatinine 0.87 04/21/2017 09:15 AM    BUN/Creatinine ratio 17 04/21/2017 09:15 AM    GFR est  04/21/2017 09:15 AM    GFR est non-AA 87 04/21/2017 09:15 AM    Calcium 9.6 04/21/2017 09:15 AM    Bilirubin, total 0.5 04/21/2017 09:15 AM    AST (SGOT) 28 04/21/2017 09:15 AM    Alk. phosphatase 34 (L) 04/21/2017 09:15 AM    Protein, total 6.5 04/21/2017 09:15 AM    Albumin 4.5 04/21/2017 09:15 AM    Globulin 3.3 01/09/2012 07:50 AM    A-G Ratio 2.3 (H) 04/21/2017 09:15 AM    ALT (SGPT) 20 04/21/2017 09:15 AM       EKG:  NSR      Assessment:        1. PAF (paroxysmal atrial fibrillation) ([address]    2. Aortic stenosis, mild    3. Mixed hyperlipidemia    4. Normal coronary arteries        Orders Placed This Encounter    AMB POC EKG ROUTINE W/ 12 LEADS, INTER & REP     Order Specific Question:   Reason for Exam:     Answer:   routine        Plan:        Patient presents doing well and is stable from cardiac stand point. 1.  History of atrial fib/flutter: Maintaining sinus rhythm with anti-arrhythmic, continue Eliquis. 2.  Hyperlipidemia: On statin, followed by PCP.   3.  Aortic stenosis: Mild in 2015, will perform 3 year follow-up echocardiogram in the near future. 4.  Counseled on diet and exercise- eventual goal of 30-60 minutes 5-7 times a week as per AHA guidelines. Continues to work with a  TIW on core exercises.      Continue current care and f/u in 6 months.     Dimitri Crump MD

## 2018-05-31 ENCOUNTER — CLINICAL SUPPORT (OUTPATIENT)
Dept: CARDIOLOGY CLINIC | Age: 73
End: 2018-05-31

## 2018-05-31 DIAGNOSIS — E78.2 MIXED HYPERLIPIDEMIA: ICD-10-CM

## 2018-05-31 DIAGNOSIS — I48.0 PAF (PAROXYSMAL ATRIAL FIBRILLATION) (HCC): ICD-10-CM

## 2018-05-31 DIAGNOSIS — I35.0 AORTIC STENOSIS, MILD: ICD-10-CM

## 2018-06-05 NOTE — PROGRESS NOTES
Please advise good news, echo stable as compared to 2 years ago. Still only very mild aortic stenosis, which means very mild blood flow limitation through the aortic valve. Unless new symptoms or change in murmur, will warrant repeat echocardiogram in 2-3 years.

## 2018-06-06 ENCOUNTER — TELEPHONE (OUTPATIENT)
Dept: CARDIOLOGY CLINIC | Age: 73
End: 2018-06-06

## 2018-06-06 NOTE — TELEPHONE ENCOUNTER
----- Message from Joseph Roa MD sent at 6/5/2018  1:02 PM EDT -----  Please advise good news, echo stable as compared to 2 years ago. Still only very mild aortic stenosis, which means very mild blood flow limitation through the aortic valve. Unless new symptoms or change in murmur, will warrant repeat echocardiogram in 2-3 years.

## 2018-08-13 RX ORDER — APIXABAN 5 MG/1
TABLET, FILM COATED ORAL
Qty: 180 TAB | Refills: 2 | Status: SHIPPED | OUTPATIENT
Start: 2018-08-13 | End: 2018-11-15 | Stop reason: SDUPTHER

## 2018-11-15 ENCOUNTER — OFFICE VISIT (OUTPATIENT)
Dept: CARDIOLOGY CLINIC | Age: 73
End: 2018-11-15

## 2018-11-15 VITALS
HEART RATE: 70 BPM | DIASTOLIC BLOOD PRESSURE: 70 MMHG | WEIGHT: 170 LBS | SYSTOLIC BLOOD PRESSURE: 138 MMHG | OXYGEN SATURATION: 98 % | BODY MASS INDEX: 23.8 KG/M2 | HEIGHT: 71 IN

## 2018-11-15 DIAGNOSIS — E78.2 MIXED HYPERLIPIDEMIA: ICD-10-CM

## 2018-11-15 DIAGNOSIS — I48.91 ATRIAL FIBRILLATION WITH RVR (HCC): ICD-10-CM

## 2018-11-15 DIAGNOSIS — I34.0 NON-RHEUMATIC MITRAL REGURGITATION: ICD-10-CM

## 2018-11-15 DIAGNOSIS — I35.0 AORTIC STENOSIS, MILD: ICD-10-CM

## 2018-11-15 DIAGNOSIS — I48.92 ATRIAL FLUTTER, UNSPECIFIED TYPE (HCC): ICD-10-CM

## 2018-11-15 DIAGNOSIS — I48.0 PAF (PAROXYSMAL ATRIAL FIBRILLATION) (HCC): Primary | ICD-10-CM

## 2018-11-15 NOTE — PROGRESS NOTES
Chief Complaint   Patient presents with    Irregular Heart Beat     6 MO. F/U     1. Have you been to the ER, urgent care clinic since your last visit? Hospitalized since your last visit? NO    2. Have you seen or consulted any other health care providers outside of the [facility] since your last visit? Include any pap smears or colon screening.  NO

## 2018-11-15 NOTE — PROGRESS NOTES
[address] [address]  [phone]     Subjective:      Candace aMthias is a 68 y.o. male is here for routine f/u. The patient denies chest pain/ shortness of breath, orthopnea, PND, LE edema, palpitations, syncope, or presyncope.        Patient Active Problem List    Diagnosis Date Noted    Irregular heart beat 09/21/2016    PAF (paroxysmal atrial fibrillation) ([address] 02/25/2016    Aortic stenosis, mild 02/25/2016    Mitral regurgitation 07/06/2015    Colon polyps 03/13/2015    Atrial fibrillation ([address] 05/17/2012    Mixed hyperlipidemia 05/17/2012    Atrial flutter ([address] 03/06/2012    Normal coronary arteries 02/02/2012    Atrial fibrillation with RVR ([address] 01/09/2012    Hallux valgus (acquired) 01/05/2012    Anemia 07/20/2011    Hyperlipidemia 07/19/2011    BPH (benign prostatic hyperplasia) 07/19/2011      Rui Narvaez MD  Past Medical History:   Diagnosis Date    Anemia 7/20/2011    Arrhythmia     A-flutter    Atrial fibrillation (HCC)     Atrial flutter (HCC)     BPH (benign prostatic hyperplasia) 7/19/2011    Colon polyps 3/13/2015    Tubular adenomas    Essential hypertension     Hyperlipidemia 7/19/2011    Long term current use of anticoagulant therapy     Other acute and subacute form of ischemic heart disease     Other ill-defined conditions(799.89) 1996    fractured ribs     Palpitations       Past Surgical History:   Procedure Laterality Date    CARDIAC CATHETERIZATION  2/2/2012         CARDIAC SURG PROCEDURE UNLIST      cardiac cath    [facility]X [facility]PA[facility]  [facility]/07[facility]    HX ORTHOPAEDIC  2011    left bunionectomy and hammertoe repair    HX OTHER SURGICAL      cardiac ablation 3/7/12    HX TONSILLECTOMY       No Known Allergies   Family History   Problem Relation Age of Onset    Heart Disease Mother     Cancer Sister     Cancer Sister       Social History     Socioeconomic History    Marital status:  Spouse name: Not on file    Number of children: Not on file    Years of education: Not on file    Highest education level: Not on file   Social Needs    Financial resource strain: Not on file    Food insecurity - worry: Not on file    Food insecurity - inability: Not on file    Transportation needs - medical: Not on file   [employer] needs - non-medical: Not on file   Occupational History    Not on file   Tobacco Use    Smoking status: Former Smoker     Last attempt to quit: 1985     Years since quittin.8    Smokeless tobacco: Never Used   Substance and Sexual Activity    Alcohol use: Yes     Alcohol/week: 7.0 oz     Types: 14 Glasses of wine per week    Drug use: No    Sexual activity: Not on file   Other Topics Concern    Not on file   Social History Narrative    Not on file      Current Outpatient Medications   Medication Sig    losartan (COZAAR) 25 mg tablet TAKE 1 TABLET DAILY    ciprofloxacin HCl (CIPRO) 500 mg tablet TAKE 1(ONE) TABLET BY MOUTH 2 TIMES A DAY. (Patient taking differently: PRN)    montelukast (SINGULAIR) 10 mg tablet TAKE 1 TABLET DAILY    flecainide (TAMBOCOR) 100 mg tablet TAKE 1 TABLET TWICE A DAY    atorvastatin (LIPITOR) 10 mg tablet TAKE 1 TABLET DAILY    dilTIAZem CD (CARDIZEM CD) 180 mg ER capsule TAKE 1 CAPSULE DAILY.  apixaban (ELIQUIS) 5 mg tablet Take 1 Tab by mouth two (2) times a day.  tadalafil (CIALIS) 5 mg tablet Take 5 mg by mouth daily.  ergocalciferol, vitamin d2, (VITAMIN D) 400 unit Cap Take  by mouth.  coenzyme q10 (CO Q-10) 10 mg Cap Take  by mouth daily. No current facility-administered medications for this visit.           Review of Symptoms:  11 systems reviewed, negative other than as stated in the HPI    Physical ExamPhysical Exam:    Vitals:    11/15/18 0921 11/15/18 0926   BP: 142/74 138/70   Pulse: 70    SpO2: 98%    Weight: 170 lb (77.1 kg)    Height: 5' 11\" (1.803 m)      Body mass index is 23.71 kg/m². General PE   Gen:  NAD  Mental Status - Alert. General Appearance - Not in acute distress. Chest and Lung Exam   Inspection: Accessory muscles - No use of accessory muscles in breathing. Auscultation:   Breath sounds: - Normal.   Cardiovascular   Inspection: Jugular vein - Bilateral - Inspection Normal.   Palpation/Percussion:   Apical Impulse: - Normal.   Auscultation: Rhythm - Regular. Heart Sounds - S1 WNL and S2 WNL. No S3 or S4. Murmurs & Other Heart Sounds: Auscultation of the heart reveals - No Murmurs. Peripheral Vascular   Upper Extremity: Inspection - Bilateral - No Cyanotic nailbeds or Digital clubbing. Lower Extremity:   Palpation: Edema - Bilateral - No edema. Abdomen:   Soft, non-tender, bowel sounds are active. Neuro: A&O times 3, CN and motor grossly WNL    Labs:   Lab Results   Component Value Date/Time    Cholesterol, total 158 11/29/2012 09:03 AM    HDL Cholesterol 71 11/29/2012 09:03 AM    LDL, calculated 77 11/29/2012 09:03 AM    Triglyceride 52 11/29/2012 09:03 AM     Lab Results   Component Value Date/Time     01/09/2012 07:50 AM     Lab Results   Component Value Date/Time    Sodium 132 (L) 04/21/2017 09:15 AM    Potassium 4.6 04/21/2017 09:15 AM    Chloride 92 (L) 04/21/2017 09:15 AM    CO2 25 04/21/2017 09:15 AM    Anion gap 7 12/05/2016 08:31 AM    Glucose 88 04/21/2017 09:15 AM    BUN 15 04/21/2017 09:15 AM    Creatinine 0.87 04/21/2017 09:15 AM    BUN/Creatinine ratio 17 04/21/2017 09:15 AM    GFR est  04/21/2017 09:15 AM    GFR est non-AA 87 04/21/2017 09:15 AM    Calcium 9.6 04/21/2017 09:15 AM    Bilirubin, total 0.5 04/21/2017 09:15 AM    AST (SGOT) 28 04/21/2017 09:15 AM    Alk. phosphatase 34 (L) 04/21/2017 09:15 AM    Protein, total 6.5 04/21/2017 09:15 AM    Albumin 4.5 04/21/2017 09:15 AM    Globulin 3.3 01/09/2012 07:50 AM    A-G Ratio 2.3 (H) 04/21/2017 09:15 AM    ALT (SGPT) 20 04/21/2017 09:15 AM       EKG:  NSR      Assessment:        1.  PAF (paroxysmal atrial fibrillation) ([address]    2. Atrial fibrillation with RVR ([address]    3. Aortic stenosis, mild    4. Mixed hyperlipidemia    5. Normal coronary arteries    6. Atrial flutter, unspecified type ([address]    7. Non-rheumatic mitral regurgitation        Orders Placed This Encounter    AMB POC EKG ROUTINE W/ 12 LEADS, INTER & REP     Order Specific Question:   Reason for Exam:     Answer:   ROUTINE        Plan:     Patient presents doing well and is stable from cardiac stand point. 1.  History of atrial fib/flutter: Maintaining sinus rhythm with anti-arrhythmic, continue Eliquis. He states he has no palpitations whatsoever. 2.  Hyperlipidemia: On statin, followed by PCP. 3.  Aortic stenosis: Mild in 2018. Recheck echo in 3 years unless murmur gets louder or symptoms develop. 4.  Counseled on diet and exercise- eventual goal of 30-60 minutes 5-7 times a week as per AHA guidelines. Continues to work with a  TIW on core exercises. Asks if he can increase the intensity of his cardio. Advised he should go for 30-40 minutes of cardio 4-5 times a week with heart rate up to 85% predicted maximal heart rate.     Follow up in 6 months, sooner PRN.         Zander Vicente MD

## 2018-12-13 DIAGNOSIS — E78.2 MIXED HYPERLIPIDEMIA: Primary | ICD-10-CM

## 2018-12-13 RX ORDER — ATORVASTATIN CALCIUM 10 MG/1
10 TABLET, FILM COATED ORAL DAILY
Qty: 90 TAB | Refills: 0 | Status: SHIPPED | OUTPATIENT
Start: 2018-12-13 | End: 2019-01-04 | Stop reason: SDUPTHER

## 2018-12-13 NOTE — PROGRESS NOTES
Jessica- please advise Mr. Lindsey Reading that I do not see any labs since April 2017. I recommend a comprehensive metabolic panel and a lipid panel prior to any additional refills of Lipitor. He can check with his primary care physician if there need to be additional labs drawn for his general health maintenance. If he has had lipids and conference of metabolic panel performed somewhere else, we can get a copy and cancel the new lab draw.

## 2018-12-28 ENCOUNTER — HOSPITAL ENCOUNTER (OUTPATIENT)
Dept: LAB | Age: 73
Discharge: HOME | End: 2018-12-28
Payer: MEDICARE

## 2018-12-28 PROCEDURE — 36415 COLL VENOUS BLD VENIPUNCTURE: CPT

## 2018-12-28 PROCEDURE — 80053 COMPREHEN METABOLIC PANEL: CPT

## 2018-12-28 PROCEDURE — 80061 LIPID PANEL: CPT

## 2018-12-29 LAB
ALBUMIN SERPL-MCNC: 4.1 G/DL (ref 3.5–4.8)
ALBUMIN/GLOB SERPL: 2 {RATIO} (ref 1.2–2.2)
ALP SERPL-CCNC: 46 IU/L (ref 39–117)
ALT SERPL-CCNC: 27 IU/L (ref 0–44)
AST SERPL-CCNC: 34 IU/L (ref 0–40)
BILIRUB SERPL-MCNC: 0.4 MG/DL (ref 0–1.2)
BUN SERPL-MCNC: 25 MG/DL (ref 8–27)
BUN/CREAT SERPL: 21 (ref 10–24)
CALCIUM SERPL-MCNC: 9.2 MG/DL (ref 8.6–10.2)
CHLORIDE SERPL-SCNC: 101 MMOL/L (ref 96–106)
CHOLEST SERPL-MCNC: 135 MG/DL (ref 100–199)
CO2 SERPL-SCNC: 23 MMOL/L (ref 20–29)
CREAT SERPL-MCNC: 1.18 MG/DL (ref 0.76–1.27)
GLOBULIN SER CALC-MCNC: 2.1 G/DL (ref 1.5–4.5)
GLUCOSE SERPL-MCNC: 99 MG/DL (ref 65–99)
HDLC SERPL-MCNC: 62 MG/DL
INTERPRETATION, 910389: NORMAL
LDLC SERPL CALC-MCNC: 53 MG/DL (ref 0–99)
POTASSIUM SERPL-SCNC: 4.2 MMOL/L (ref 3.5–5.2)
PROT SERPL-MCNC: 6.2 G/DL (ref 6–8.5)
SODIUM SERPL-SCNC: 140 MMOL/L (ref 134–144)
TRIGL SERPL-MCNC: 98 MG/DL (ref 0–149)
VLDLC SERPL CALC-MCNC: 20 MG/DL (ref 5–40)

## 2019-01-03 NOTE — PROGRESS NOTES
Cholesterol and labs look perfect. Continue with current medications, healthy diet and exercise. Let me know if any questions or concerns.

## 2019-01-04 ENCOUNTER — TELEPHONE (OUTPATIENT)
Dept: CARDIOLOGY CLINIC | Age: 74
End: 2019-01-04

## 2019-01-04 RX ORDER — ATORVASTATIN CALCIUM 10 MG/1
10 TABLET, FILM COATED ORAL DAILY
Qty: 90 TAB | Refills: 3 | Status: SHIPPED | OUTPATIENT
Start: 2019-01-04 | End: 2020-01-27

## 2019-01-04 NOTE — TELEPHONE ENCOUNTER
----- Message from Chelsey Gaffney MD sent at 1/3/2019  6:19 PM EST -----  Cholesterol and labs look perfect. Continue with current medications, healthy diet and exercise. Let me know if any questions or concerns.

## 2019-02-12 DIAGNOSIS — E78.2 MIXED HYPERLIPIDEMIA: ICD-10-CM

## 2019-03-06 ENCOUNTER — TELEPHONE (OUTPATIENT)
Dept: CARDIOLOGY CLINIC | Age: 74
End: 2019-03-06

## 2019-03-06 NOTE — TELEPHONE ENCOUNTER
LUCILLE 11/15/18 [facility] requesting approval to hold blood thinner prior to BUL Levator Advancement 4/10/19 please advise thanks

## 2019-03-15 DIAGNOSIS — E78.2 MIXED HYPERLIPIDEMIA: ICD-10-CM

## 2019-04-11 RX ORDER — DILTIAZEM HYDROCHLORIDE 180 MG/1
180 CAPSULE, COATED, EXTENDED RELEASE ORAL DAILY
Qty: 90 CAP | Refills: 3 | Status: SHIPPED | OUTPATIENT
Start: 2019-04-11 | End: 2020-02-06 | Stop reason: SDUPTHER

## 2019-04-19 RX ORDER — APIXABAN 5 MG/1
TABLET, FILM COATED ORAL
Qty: 180 TAB | Refills: 2 | Status: SHIPPED | OUTPATIENT
Start: 2019-04-19 | End: 2019-06-03

## 2019-06-03 ENCOUNTER — OFFICE VISIT (OUTPATIENT)
Dept: CARDIOLOGY CLINIC | Age: 74
End: 2019-06-03

## 2019-06-03 VITALS
SYSTOLIC BLOOD PRESSURE: 124 MMHG | OXYGEN SATURATION: 97 % | DIASTOLIC BLOOD PRESSURE: 58 MMHG | RESPIRATION RATE: 16 BRPM | HEART RATE: 68 BPM | HEIGHT: 71 IN | WEIGHT: 171 LBS | BODY MASS INDEX: 23.94 KG/M2

## 2019-06-03 DIAGNOSIS — I34.0 NON-RHEUMATIC MITRAL REGURGITATION: ICD-10-CM

## 2019-06-03 DIAGNOSIS — I48.0 PAF (PAROXYSMAL ATRIAL FIBRILLATION) (HCC): Primary | ICD-10-CM

## 2019-06-03 DIAGNOSIS — I35.0 AORTIC STENOSIS, MILD: ICD-10-CM

## 2019-06-03 DIAGNOSIS — I48.92 ATRIAL FLUTTER, UNSPECIFIED TYPE (HCC): ICD-10-CM

## 2019-06-03 DIAGNOSIS — E78.2 MIXED HYPERLIPIDEMIA: ICD-10-CM

## 2019-06-03 NOTE — LETTER
6/3/19 Patient: Josh Pantoja YOB: 1945 Date of Visit: 6/3/2019 Bel Mckeon, [address] [place] [address] [id number] VIA In Basket Dear Bel Mckeon MD, Thank you for referring Mr. Vivian Rand to [address] [place] [address] for evaluation. My notes for this consultation are attached. If you have questions, please do not hesitate to call me. I look forward to following your patient along with you.  
 
 
Sincerely, 
 
Jazlyn Israel MD

## 2019-06-03 NOTE — PROGRESS NOTES
Chief Complaint   Patient presents with    Irregular Heart Beat     6 month follow up     1. Have you been to the ER, urgent care clinic since your last visit? Hospitalized since your last visit? No    2. Have you seen or consulted any other health care providers outside of the [address] [place] [address] since your last visit? Include any pap smears or colon screening.  No

## 2019-06-03 NOTE — PROGRESS NOTES
[address]  [phone]     Subjective:      Yahir Black is a 68 y.o. male is here for routine f/u. The patient denies chest pain/ shortness of breath, orthopnea, PND, LE edema, palpitations, syncope, or presyncope.        Patient Active Problem List    Diagnosis Date Noted    Irregular heart beat 09/21/2016    PAF (paroxysmal atrial fibrillation) ([address] 02/25/2016    Aortic stenosis, mild 02/25/2016    Mitral regurgitation 07/06/2015    Colon polyps 03/13/2015    Atrial fibrillation ([address] 05/17/2012    Mixed hyperlipidemia 05/17/2012    Atrial flutter ([facility] 03/06/2012    Normal coronary arteries 02/02/2012    Atrial fibrillation with RVR ([facility] 01/09/2012    Hallux valgus (acquired) 01/05/2012    Anemia 07/20/2011    Hyperlipidemia 07/19/2011    BPH (benign prostatic hyperplasia) 07/19/2011      Oli Wiseman MD  Past Medical History:   Diagnosis Date    Anemia 7/20/2011    Arrhythmia     A-flutter    Atrial fibrillation (HCC)     Atrial flutter (HCC)     BPH (benign prostatic hyperplasia) 7/19/2011    Colon polyps 3/13/2015    Tubular adenomas    Essential hypertension     Hyperlipidemia 7/19/2011    Long term current use of anticoagulant therapy     Other acute and subacute form of ischemic heart disease     Other ill-defined conditions(799[address]89) 1996    fractured ribs     Palpitations       Past Surgical History:   Procedure Laterality Date    CARDIAC CATHETERIZATION  2/2/2012         CARDIAC SURG PROCEDURE UNLIST      cardiac cath    HX [facility]    HX ORTHOPAEDIC  2011    left bunionectomy and hammertoe repair    HX OTHER SURGICAL      cardiac ablation 3/7/12    HX TONSILLECTOMY       No Known Allergies   Family History   Problem Relation Age of Onset    Heart Disease Mother     Cancer Sister     Cancer Sister       Social History     Socioeconomic History    Marital status:  Spouse name: Not on file    Number of children: Not on file    Years of education: Not on file    Highest education level: Not on file   Occupational History    Not on file   Social Needs    Financial resource strain: Not on file    Food insecurity:     Worry: Not on file     Inability: Not on file    Transportation needs:     Medical: Not on file     Non-medical: Not on file   Tobacco Use    Smoking status: Former Smoker     Last attempt to quit: 1985     Years since quittin.3    Smokeless tobacco: Never Used   Substance and Sexual Activity    Alcohol use: Yes     Alcohol/week: 7.0 oz     Types: 14 Glasses of wine per week    Drug use: No    Sexual activity: Not on file   Lifestyle    Physical activity:     Days per week: Not on file     Minutes per session: Not on file    Stress: Not on file   Relationships    Social connections:     Talks on phone: Not on file     Gets together: Not on file     Attends Worship service: Not on file     Active member of club or organization: Not on file     Attends meetings of clubs or organizations: Not on file     Relationship status: Not on file    Intimate partner violence:     Fear of current or ex partner: Not on file     Emotionally abused: Not on file     Physically abused: Not on file     Forced sexual activity: Not on file   Other Topics Concern    Not on file   Social History Narrative    Not on file      Current Outpatient Medications   Medication Sig    montelukast (SINGULAIR) 10 mg tablet TAKE 1 TABLET DAILY    dilTIAZem CD (CARDIZEM CD) 180 mg ER capsule Take 1 Cap by mouth daily.  atorvastatin (LIPITOR) 10 mg tablet Take 1 Tab by mouth daily.  losartan (COZAAR) 25 mg tablet TAKE 1 TABLET DAILY    ciprofloxacin HCl (CIPRO) 500 mg tablet TAKE 1(ONE) TABLET BY MOUTH 2 TIMES A DAY.  (Patient taking differently: PRN)    flecainide (TAMBOCOR) 100 mg tablet TAKE 1 TABLET TWICE A DAY    apixaban (ELIQUIS) 5 mg tablet Take 1 Tab by mouth two (2) times a day.  tadalafil (CIALIS) 5 mg tablet Take 5 mg by mouth daily.  ergocalciferol, vitamin d2, (VITAMIN D) 400 unit Cap Take  by mouth.  coenzyme q10 (CO Q-10) 10 mg Cap Take  by mouth daily. No current facility-administered medications for this visit. Review of Symptoms:  11 systems reviewed, negative other than as stated in the HPI    Physical ExamPhysical Exam:    Vitals:    06/03/19 1033 06/03/19 1040   BP: 130/60 124/58   Pulse: 68    Resp: 16    SpO2: 97%    Weight: 171 lb (77.6 kg)    Height: 5' 11\" (1.803 m)      Body mass index is 23.85 kg/m². General PE   Gen:  NAD  Mental Status - Alert. General Appearance - Not in acute distress. Chest and Lung Exam   Inspection: Accessory muscles - No use of accessory muscles in breathing. Auscultation:   Breath sounds: - Normal.   Cardiovascular   Inspection: Jugular vein - Bilateral - Inspection Normal.   Palpation/Percussion:   Apical Impulse: - Normal.   Auscultation: Rhythm - Regular. Heart Sounds - S1 WNL and S2 WNL. No S3 or S4. Murmurs & Other Heart Sounds: Auscultation of the heart reveals - No Murmurs. Peripheral Vascular   Upper Extremity: Inspection - Bilateral - No Cyanotic nailbeds or Digital clubbing. Lower Extremity:   Palpation: Edema - Bilateral - No edema. Abdomen:   Soft, non-tender, bowel sounds are active.   Neuro: A&O times 3, CN and motor grossly WNL    Labs:   Lab Results   Component Value Date/Time    Cholesterol, total 135 12/28/2018 03:47 PM    Cholesterol, total 158 11/29/2012 09:03 AM    HDL Cholesterol 62 12/28/2018 03:47 PM    HDL Cholesterol 71 11/29/2012 09:03 AM    LDL, calculated 53 12/28/2018 03:47 PM    LDL, calculated 77 11/29/2012 09:03 AM    Triglyceride 98 12/28/2018 03:47 PM    Triglyceride 52 11/29/2012 09:03 AM     Lab Results   Component Value Date/Time     01/09/2012 07:50 AM     Lab Results   Component Value Date/Time    Sodium 140 12/28/2018 03:47 PM Potassium 4.2 2018 03:47 PM    Chloride 101 2018 03:47 PM    CO2 23 2018 03:47 PM    Anion gap 7 2016 08:31 AM    Glucose 99 2018 03:47 PM    BUN 25 2018 03:47 PM    Creatinine 1.18 2018 03:47 PM    BUN/Creatinine ratio 21 2018 03:47 PM    GFR est AA 70 2018 03:47 PM    GFR est non-AA 61 2018 03:47 PM    Calcium 9.2 2018 03:47 PM    Bilirubin, total 0.4 2018 03:47 PM    AST (SGOT) 34 2018 03:47 PM    Alk. phosphatase 46 2018 03:47 PM    Protein, total 6.2 2018 03:47 PM    Albumin 4.1 2018 03:47 PM    Globulin 3.3 2012 07:50 AM    A-G Ratio 2.0 2018 03:47 PM    ALT (SGPT) 27 2018 03:47 PM       EKG:  NSR      Assessment:     Assessment:      1. PAF (paroxysmal atrial fibrillation) ([address]    2. Mixed hyperlipidemia    3. Normal coronary arteries    4. Atrial flutter, unspecified type ([address]    5. Non-rheumatic mitral regurgitation    6. Aortic stenosis, mild        Orders Placed This Encounter    AMB POC EKG ROUTINE W/ 12 LEADS, INTER & REP     Order Specific Question:   Reason for Exam:     Answer:   routine        Plan:     Patient presents for f/u, doing well and stable from cardiac standpoint. He denies any palpitations    History of atrial fib/flutter  Maintaining sinus rhythm. Continue Diltiazem, Flecainide 100 mg BID  Continue Eliquis. Hyperlipidemia:   LDL at 53. On statin    HTN:  Controlled. Aortic stenosis: Mild in 2018. Recheck echo in 3 years unless murmur gets louder or symptoms develop. Counseled on diet and exercise- eventual goal of 30-60 minutes 5-7 times a week as per AHA guidelines.   Continues to work with a  TIW on core exercises. Advised he should go for 30-40 minutes of cardio 4-5 times a week with heart rate up to 85% predicted maximal heart rate.     Patient expressed concern about a nonblood relative (cousin by marriage) who  of a suddenly ruptured brain aneurysm:  I advised that there is no routine screening for brain aneurysm recommended for the general public. I advised that first-degree family members may benefit for screening, but that I would need to look up the guidelines for appropriate screening in these individuals. Continue current care and f/u in 6 months, sooner as needed.     Josué Nogueira MD

## 2019-08-12 ENCOUNTER — TELEPHONE (OUTPATIENT)
Dept: CARDIOLOGY CLINIC | Age: 74
End: 2019-08-12

## 2019-08-12 NOTE — TELEPHONE ENCOUNTER
Pt called saying he went for an annual visit w/ his PCP and he is currently taking Advil PM for a sleep aid, his PCP suggested that he take Tylenol PM and to double check w/ Dr. Heaven Ivory, please give pt a call regarding his concerns    thanks

## 2019-08-26 RX ORDER — LOSARTAN POTASSIUM 25 MG/1
TABLET ORAL
Qty: 90 TAB | Refills: 1 | Status: SHIPPED | OUTPATIENT
Start: 2019-08-26 | End: 2020-02-06 | Stop reason: SDUPTHER

## 2019-10-29 ENCOUNTER — HOSPITAL ENCOUNTER (OUTPATIENT)
Dept: GENERAL RADIOLOGY | Age: 74
Discharge: HOME | End: 2019-10-29
Attending: INTERNAL MEDICINE
Payer: MEDICARE

## 2019-10-29 DIAGNOSIS — R05.9 COUGH: ICD-10-CM

## 2019-10-29 PROCEDURE — 71046 X-RAY EXAM CHEST 2 VIEWS: CPT

## 2019-12-12 ENCOUNTER — OFFICE VISIT (OUTPATIENT)
Dept: CARDIOLOGY CLINIC | Age: 74
End: 2019-12-12

## 2019-12-12 VITALS
RESPIRATION RATE: 16 BRPM | HEART RATE: 64 BPM | HEIGHT: 71 IN | WEIGHT: 171.1 LBS | SYSTOLIC BLOOD PRESSURE: 130 MMHG | BODY MASS INDEX: 23.95 KG/M2 | OXYGEN SATURATION: 94 % | DIASTOLIC BLOOD PRESSURE: 70 MMHG

## 2019-12-12 DIAGNOSIS — I35.0 AORTIC STENOSIS, MILD: ICD-10-CM

## 2019-12-12 DIAGNOSIS — E78.2 MIXED HYPERLIPIDEMIA: ICD-10-CM

## 2019-12-12 DIAGNOSIS — I48.0 PAF (PAROXYSMAL ATRIAL FIBRILLATION) (HCC): Primary | ICD-10-CM

## 2019-12-12 DIAGNOSIS — I34.0 NONRHEUMATIC MITRAL VALVE REGURGITATION: ICD-10-CM

## 2019-12-12 DIAGNOSIS — I10 ESSENTIAL HYPERTENSION: ICD-10-CM

## 2019-12-12 NOTE — PROGRESS NOTES
GÓMEZ Sol-BC    Subjective/HPI:     Mr. Kathie Weller is a 76 y.o. male is here for routine f/u. He has a PMHx of PAF, aortic stenosis, HTN and HLD. He is doing well. He spent the year vacationing at the [facility] for a few weeks, and then in [facility] with a few friends. Him and his wife recently celebrated their 53th wedding anniversary this Fall. He denies complaints of chest pains, dizziness, orthopnea, PND or edema. He denies shortness of breath or palpitation symptoms.          PCP Provider  Zohra Casas MD    Patient Active Problem List   Diagnosis Code    Hyperlipidemia E78.5    BPH (benign prostatic hyperplasia) N40.0    Anemia D64.9    Hallux valgus (acquired) M20.10    Atrial fibrillation with RVR (HCC) I48.91    Normal coronary arteries Z03.89    Atrial flutter (HCC) I48.92    Atrial fibrillation (HCC) I48.91    Mixed hyperlipidemia E78.2    Colon polyps K63.5    Mitral regurgitation I34.0    PAF (paroxysmal atrial fibrillation) (HCC) I48.0    Aortic stenosis, mild I35.0    Irregular heart beat I49.9    BCC (basal cell carcinoma of skin) C44.91     Past Surgical History:   Procedure Laterality Date    CARDIAC CATHETERIZATION  2/2/2012         CARDIAC SURG PROCEDURE UNLIST      cardiac cath    HX HERNIA REPAIR  12/07/2016    [facility] WITH MESH    HX ORTHOPAEDIC  2011    left bunionectomy and hammertoe repair    HX OTHER SURGICAL      cardiac ablation 3/7/12    HX TONSILLECTOMY       Family History   Problem Relation Age of Onset    Heart Disease Mother     Cancer Sister     Cancer Sister      Social History     Socioeconomic History    Marital status:      Spouse name: Not on file    Number of children: Not on file    Years of education: Not on file    Highest education level: Not on file   Occupational History    Not on file   Social Needs    Financial resource strain: Not on file    Food insecurity:     Worry: Not on file     Inability: Not on file    Transportation needs:     Medical: Not on file     Non-medical: Not on file   Tobacco Use    Smoking status: Former Smoker     Last attempt to quit: 1985     Years since quittin.8    Smokeless tobacco: Never Used   Substance and Sexual Activity    Alcohol use: Yes     Alcohol/week: 11.7 standard drinks     Types: 14 Glasses of wine per week    Drug use: No    Sexual activity: Not on file   Lifestyle    Physical activity:     Days per week: Not on file     Minutes per session: Not on file    Stress: Not on file   Relationships    Social connections:     Talks on phone: Not on file     Gets together: Not on file     Attends Rastafari service: Not on file     Active member of club or organization: Not on file     Attends meetings of clubs or organizations: Not on file     Relationship status: Not on file    Intimate partner violence:     Fear of current or ex partner: Not on file     Emotionally abused: Not on file     Physically abused: Not on file     Forced sexual activity: Not on file   Other Topics Concern    Not on file   Social History Narrative    Not on file       No Known Allergies     Current Outpatient Medications   Medication Sig    guaiFENesin-dextromethorphan SR (MUCINEX DM) 600-30 mg per tablet Take 1 Tab by mouth two (2) times a day.  losartan (COZAAR) 25 mg tablet TAKE 1 TABLET DAILY    montelukast (SINGULAIR) 10 mg tablet TAKE 1 TABLET DAILY    dilTIAZem CD (CARDIZEM CD) 180 mg ER capsule Take 1 Cap by mouth daily.  atorvastatin (LIPITOR) 10 mg tablet Take 1 Tab by mouth daily.  ciprofloxacin HCl (CIPRO) 500 mg tablet TAKE 1(ONE) TABLET BY MOUTH 2 TIMES A DAY. (Patient taking differently: PRN)    flecainide (TAMBOCOR) 100 mg tablet TAKE 1 TABLET TWICE A DAY    apixaban (ELIQUIS) 5 mg tablet Take 1 Tab by mouth two (2) times a day.  tadalafil (CIALIS) 5 mg tablet Take 5 mg by mouth daily.     ergocalciferol, vitamin d2, (VITAMIN D) 400 unit Cap Take by mouth.  coenzyme q10 (CO Q-10) 10 mg Cap Take  by mouth daily. No current facility-administered medications for this visit. I have reviewed the problem list, allergy list, medical history, family, social history and medications. Review of Symptoms:    Review of Systems   Constitutional: Negative for chills, fever and weight loss. HENT: Negative for nosebleeds. Eyes: Negative for blurred vision and double vision. Respiratory: Negative for cough, shortness of breath and wheezing. Cardiovascular: Negative for chest pain, palpitations, orthopnea, leg swelling and PND. Gastrointestinal: Negative for abdominal pain, blood in stool, diarrhea, nausea and vomiting. Musculoskeletal: Negative for joint pain. Skin: Negative for rash. Neurological: Negative for dizziness, tingling and loss of consciousness. Endo/Heme/Allergies: Does not bruise/bleed easily. Physical Exam:      General: Well developed, in no acute distress, cooperative and alert  HEENT: No carotid bruits, no JVD, trach is midline. Neck Supple, PEERL, EOM intact. Heart:  reg rate and rhythm; normal S1/S2; no murmurs, gallops or rubs. Respiratory: Clear bilaterally x 4, no wheezing or rales  Abdomen:   Soft, non-tender, no distention, no masses. + BS. Extremities:  Normal cap refill, no cyanosis, atraumatic. No edema. Neuro: A&Ox3, speech clear, gait stable. Skin: Skin color is normal. No rashes or lesions.  Non diaphoretic  Vascular: 2+ pulses symmetric in all extremities    Vitals:    12/12/19 0938   BP: 130/70   Pulse: 64   Resp: 16   SpO2: 94%   Weight: 171 lb 1.6 oz (77.6 kg)   Height: 5' 11\" (1.803 m)       Cardiographics    ECG: sinus rhythm  Results for orders placed or performed during the hospital encounter of 03/07/12   EKG, 12 LEAD, INITIAL   Result Value Ref Range    Ventricular Rate 74 BPM    Atrial Rate 74 BPM    P-R Interval 188 ms    QRS Duration 92 ms    Q-T Interval 390 ms    QTC Calculation (Bezet) 432 ms    Calculated P Axis 78 degrees    Calculated R Axis 20 degrees    Calculated T Axis 54 degrees    Diagnosis       Normal sinus rhythm  Possible Left atrial enlargement  When compared with ECG of 09-JAN-2012 10:23,  Criteria for Anterior infarct are no longer present  T wave amplitude has decreased in Anterior leads  Confirmed by STELLA Willis ([id number]) on 3/8/2012 11:04:13 AM       Cardiology Labs:  Lab Results   Component Value Date/Time    Cholesterol, total 135 12/28/2018 03:47 PM    HDL Cholesterol 62 12/28/2018 03:47 PM    LDL, calculated 53 12/28/2018 03:47 PM    Triglyceride 98 12/28/2018 03:47 PM       Lab Results   Component Value Date/Time    Sodium 131 (L) 08/05/2019 10:04 AM    Potassium 4.8 08/05/2019 10:04 AM    Chloride 92 (L) 08/05/2019 10:04 AM    CO2 27 08/05/2019 10:04 AM    Anion gap 7 12/05/2016 08:31 AM    Glucose 77 08/05/2019 10:04 AM    BUN 15 08/05/2019 10:04 AM    Creatinine 0.68 (L) 08/05/2019 10:04 AM    BUN/Creatinine ratio 22 08/05/2019 10:04 AM    GFR est  08/05/2019 10:04 AM    GFR est non-AA 95 08/05/2019 10:04 AM    Calcium 9.3 08/05/2019 10:04 AM    Bilirubin, total 0.4 08/05/2019 10:04 AM    AST (SGOT) 34 08/05/2019 10:04 AM    Alk. phosphatase 37 (L) 08/05/2019 10:04 AM    Protein, total 6.2 08/05/2019 10:04 AM    Albumin 4.5 08/05/2019 10:04 AM    Globulin 3.3 01/09/2012 07:50 AM    A-G Ratio 2.6 (H) 08/05/2019 10:04 AM    ALT (SGPT) 28 08/05/2019 10:04 AM        Orders Placed This Encounter    AMB POC EKG ROUTINE W/ 12 LEADS, INTER & REP     Order Specific Question:   Reason for Exam:     Answer:   routine    guaiFENesin-dextromethorphan SR (MUCINEX DM) 600-30 mg per tablet     Sig: Take 1 Tab by mouth two (2) times a day. Assessment:     Assessment:       ICD-10-CM ICD-9-CM    1. PAF (paroxysmal atrial fibrillation) (HCC) I48.0 427.31    2. Aortic stenosis, mild I35.0 424.1    3.  Nonrheumatic mitral valve regurgitation I34.0 424.0 4. Essential hypertension I10 401.9 AMB POC EKG ROUTINE W/ 12 LEADS, INTER & REP   5. Mixed hyperlipidemia E78.2 272.2         Plan:     1. PAF (paroxysmal atrial fibrillation) (HCC)  No recurrence; maintaining sinus rhythm  Continue with Eliquis for stroke prevention  Continue diltiazem for rate control    2. Aortic stenosis, mild  Mild aortic stenosis with preserved ejection fraction in 5/2018  Remains asymptomatic  Plan to repeat echo in 5/2021 to assess progression    3. Nonrheumatic mitral valve regurgitation  Mild MR with preserved ejection fraction 5/2018  Continue afterload reduction    4. Essential hypertension  BP controlled. Continue anti-hypertensive therapy and low sodium diet    5. Mixed hyperlipidemia  Continue statin therapy and low fat, low cholesterol diet  Lipids managed by PCP    Counseled on diet and exercise- eventual goal of 30-60 minutes 5-7 times a week as per AHA guidelines. He continues to work 3 days a week with a  and walks frequently. F/u with Dr. Wesly Charlton in 6 months.     Ariel Gomez MD

## 2019-12-12 NOTE — PROGRESS NOTES
1. Have you been to the ER, urgent care clinic since your last visit? Hospitalized since your last visit? No    2. Have you seen or consulted any other health care providers outside of the [address] [place] [address] since your last visit? Include any pap smears or colon screening. Yes eye lid surgery.     Chief Complaint   Patient presents with    Follow-up    Irregular Heart Beat

## 2020-02-06 RX ORDER — ATORVASTATIN CALCIUM 10 MG/1
TABLET, FILM COATED ORAL
Qty: 90 TAB | Refills: 0 | Status: SHIPPED | OUTPATIENT
Start: 2020-02-06 | End: 2020-05-13 | Stop reason: SDUPTHER

## 2020-02-06 RX ORDER — FLECAINIDE ACETATE 100 MG/1
TABLET ORAL
Qty: 180 TAB | Refills: 3 | Status: SHIPPED | OUTPATIENT
Start: 2020-02-06 | End: 2020-04-02

## 2020-02-06 RX ORDER — LOSARTAN POTASSIUM 25 MG/1
TABLET ORAL
Qty: 90 TAB | Refills: 3 | Status: SHIPPED | OUTPATIENT
Start: 2020-02-06 | End: 2020-02-11

## 2020-02-06 RX ORDER — DILTIAZEM HYDROCHLORIDE 180 MG/1
180 CAPSULE, COATED, EXTENDED RELEASE ORAL DAILY
Qty: 90 CAP | Refills: 3 | Status: SHIPPED | OUTPATIENT
Start: 2020-02-06 | End: 2020-04-02

## 2020-02-28 RX ORDER — APIXABAN 5 MG/1
TABLET, FILM COATED ORAL
Qty: 180 TAB | Refills: 1 | Status: SHIPPED | OUTPATIENT
Start: 2020-02-28 | End: 2020-09-08

## 2020-04-02 RX ORDER — DILTIAZEM HYDROCHLORIDE 180 MG/1
CAPSULE, COATED, EXTENDED RELEASE ORAL
Qty: 90 CAP | Refills: 3 | Status: SHIPPED | OUTPATIENT
Start: 2020-04-02 | End: 2021-01-13

## 2020-06-15 NOTE — PROGRESS NOTES
[address] [place] [address], [address] [facility]  [phone]     Subjective:      Shell Cruz is a 76 y.o. male is here for routine f/u. He has a PMHx of PAF, aortic stenosis, HTN and HLD. He walks 2-3 miles a day, no exertional symptoms. Had sinus issues / recent viral illness, covid negative 6/2020. Last seen in clinic 12/19. He spent the year vacationing at the [facility] for a few weeks, and then in [facility] with a few friends. Him and his wife recently celebrated their 53th wedding anniversary this Fall. The patient denies chest pain/ shortness of breath, orthopnea, PND, LE edema, palpitations, syncope, or presyncope.        Patient Active Problem List    Diagnosis Date Noted    BCC (basal cell carcinoma of skin)     Irregular heart beat 09/21/2016    PAF (paroxysmal atrial fibrillation) ([address] 02/25/2016    Aortic stenosis, mild 02/25/2016    Mitral regurgitation 07/06/2015    Colon polyps 03/13/2015    Atrial fibrillation ([address] 05/17/2012    Mixed hyperlipidemia 05/17/2012    Atrial flutter ([address] 03/06/2012    Normal coronary arteries 02/02/2012    Atrial fibrillation with RVR ([address] 01/09/2012    Hallux valgus (acquired) 01/05/2012    Anemia 07/20/2011    Hyperlipidemia 07/19/2011    BPH (benign prostatic hyperplasia) 07/19/2011      Dina Kayser, MD  Past Medical History:   Diagnosis Date    Anemia 7/20/2011    Arrhythmia     A-flutter    Atrial fibrillation (HCC)     Atrial flutter (HCC)     BCC (basal cell carcinoma of skin)     BPH (benign prostatic hyperplasia) 7/19/2011    Colon polyps 3/13/2015    Tubular adenomas    Essential hypertension     Hyperlipidemia 7/19/2011    Long term current use of anticoagulant therapy     Other acute and subacute form of ischemic heart disease     Other ill-defined conditions(799.89) 1996    fractured ribs     Palpitations       Past Surgical History:   Procedure Laterality Date    CARDIAC CATHETERIZATION  2012         CARDIAC SURG PROCEDURE UNLIST      cardiac cath    HX HERNIA REPAIR  2016    [facility] WITH MESH    HX ORTHOPAEDIC      left bunionectomy and hammertoe repair    HX OTHER SURGICAL      cardiac ablation 3/7/12    HX TONSILLECTOMY       No Known Allergies   Family History   Problem Relation Age of Onset    Heart Disease Mother     Cancer Sister     Cancer Sister       Social History     Socioeconomic History    Marital status:      Spouse name: Not on file    Number of children: Not on file    Years of education: Not on file    Highest education level: Not on file   Occupational History    Not on file   Social Needs    Financial resource strain: Not on file    Food insecurity     Worry: Not on file     Inability: Not on file    Transportation needs     Medical: Not on file     Non-medical: Not on file   Tobacco Use    Smoking status: Former Smoker     Last attempt to quit: 1985     Years since quittin.3    Smokeless tobacco: Never Used   Substance and Sexual Activity    Alcohol use:  Yes     Alcohol/week: 11.7 standard drinks     Types: 14 Glasses of wine per week    Drug use: No    Sexual activity: Not on file   Lifestyle    Physical activity     Days per week: Not on file     Minutes per session: Not on file    Stress: Not on file   Relationships    Social connections     Talks on phone: Not on file     Gets together: Not on file     Attends Hindu service: Not on file     Active member of club or organization: Not on file     Attends meetings of clubs or organizations: Not on file     Relationship status: Not on file    Intimate partner violence     Fear of current or ex partner: Not on file     Emotionally abused: Not on file     Physically abused: Not on file     Forced sexual activity: Not on file   Other Topics Concern    Not on file   Social History Narrative    Not on file      Current Outpatient Medications   Medication Sig  atorvastatin (LIPITOR) 10 mg tablet TAKE 1 TABLET DAILY    flecainide (TAMBOCOR) 100 mg tablet TAKE 1 TABLET TWICE A DAY    dilTIAZem CD (CARDIZEM CD) 180 mg ER capsule TAKE 1 CAPSULE DAILY    ELIQUIS 5 mg tablet TAKE 1 TABLET TWICE A DAY    losartan (COZAAR) 25 mg tablet TAKE 1 TABLET DAILY    guaiFENesin-dextromethorphan SR (MUCINEX DM) 600-30 mg per tablet Take 1 Tab by mouth as needed.  montelukast (SINGULAIR) 10 mg tablet TAKE 1 TABLET DAILY    ciprofloxacin HCl (CIPRO) 500 mg tablet TAKE 1(ONE) TABLET BY MOUTH 2 TIMES A DAY. (Patient taking differently: PRN)    tadalafil (CIALIS) 5 mg tablet Take 5 mg by mouth daily.  ergocalciferol, vitamin d2, (VITAMIN D) 400 unit Cap Take  by mouth.  coenzyme q10 (CO Q-10) 10 mg Cap Take  by mouth daily. No current facility-administered medications for this visit. Review of Symptoms:  11 systems reviewed, negative other than as stated in the HPI    Physical ExamPhysical Exam:    Vitals:    06/18/20 0920 06/18/20 0942   BP: 170/80 160/80   Pulse: 84    Resp: 16    SpO2: 98%    Weight: 170 lb 9.6 oz (77.4 kg)    Height: 5' 11\" (1.803 m)      Body mass index is 23.79 kg/m². General PE  Gen:  NAD  Mental Status - Alert. General Appearance - Not in acute distress. HEENT:  PERRL, no carotid bruits or JVD  Chest and Lung Exam   Inspection: Accessory muscles - No use of accessory muscles in breathing. Auscultation:   Breath sounds: - Normal.   Cardiovascular   Inspection: Jugular vein - Bilateral - Inspection Normal.   Palpation/Percussion:   Apical Impulse: - Normal.   Auscultation: Rhythm - Regular. Heart Sounds - S1 WNL and S2 WNL. No S3 or S4. Murmurs & Other Heart Sounds: Auscultation of the heart reveals -  3/6 MANSOOR  Peripheral Vascular   Upper Extremity: Inspection - Bilateral - No Cyanotic nailbeds or Digital clubbing. Lower Extremity:   Palpation: Edema - Bilateral - No edema.   Abdomen:   Soft, non-tender, bowel sounds are active. Neuro: A&O times 3, CN and motor grossly WNL    Labs:   Lab Results   Component Value Date/Time    Cholesterol, total 135 12/28/2018 03:47 PM    Cholesterol, total 158 11/29/2012 09:03 AM    HDL Cholesterol 62 12/28/2018 03:47 PM    HDL Cholesterol 71 11/29/2012 09:03 AM    LDL, calculated 53 12/28/2018 03:47 PM    LDL, calculated 77 11/29/2012 09:03 AM    Triglyceride 98 12/28/2018 03:47 PM    Triglyceride 52 11/29/2012 09:03 AM     Lab Results   Component Value Date/Time     01/09/2012 07:50 AM     Lab Results   Component Value Date/Time    Sodium 131 (L) 08/05/2019 10:04 AM    Potassium 4.8 08/05/2019 10:04 AM    Chloride 92 (L) 08/05/2019 10:04 AM    CO2 27 08/05/2019 10:04 AM    Anion gap 7 12/05/2016 08:31 AM    Glucose 77 08/05/2019 10:04 AM    BUN 15 08/05/2019 10:04 AM    Creatinine 0.68 (L) 08/05/2019 10:04 AM    BUN/Creatinine ratio 22 08/05/2019 10:04 AM    GFR est  08/05/2019 10:04 AM    GFR est non-AA 95 08/05/2019 10:04 AM    Calcium 9.3 08/05/2019 10:04 AM    Bilirubin, total 0.4 08/05/2019 10:04 AM    Alk. phosphatase 37 (L) 08/05/2019 10:04 AM    Protein, total 6.2 08/05/2019 10:04 AM    Albumin 4.5 08/05/2019 10:04 AM    Globulin 3.3 01/09/2012 07:50 AM    A-G Ratio 2.6 (H) 08/05/2019 10:04 AM    ALT (SGPT) 28 08/05/2019 10:04 AM       EKG:  SR     Assessment:     Assessment:      1. PAF (paroxysmal atrial fibrillation) ([address]    2. Mixed hyperlipidemia    3. Normal coronary arteries    4. Essential hypertension    5. Aortic stenosis, mild    6. Nonrheumatic mitral valve regurgitation        Orders Placed This Encounter    AMB POC EKG ROUTINE W/ 12 LEADS, INTER & REP     Order Specific Question:   Reason for Exam:     Answer:   routine        Plan:     Patient presents for follow up, doing well and stable from cardiac standpoint. He walks 2-3 miles a day, no exertional symptoms. Had sinus issues / recent viral illness, covid negative 6/2020. Last seen in clinic 12/19.   He spent the year vacationing at the [facility] for a few weeks, and then in [facility] with a few friends. Him and his wife recently celebrated their 53th wedding anniversary this Fall.       PAF  No recurrence; maintaining sinus rhythm  Continue with Eliquis for stroke prevention  Continue diltiazem for rate control, Flecainide 100 mg BID  Requesting labs from pcp     Aortic stenosis, mild  Mild aortic stenosis with preserved ejection fraction in 5/2018  Remains asymptomatic  Repeat echo now to rule out progression      Nonrheumatic mitral valve regurgitation  Mild MR with preserved ejection fraction 5/2018  Continue afterload reduction     HTN  Will monitor at home and will call us is > 140/80  Defers any med adjustment at this time     HLD  On statin. Lipids and labs followed by PCP. Requesting labs from pcp     Answered some questions about venous insufficiency as it might relate to his wife's legs. Counseled on diet and exercise- eventual goal of 30-60 minutes 5-7 times a week as per AHA guidelines. He continues to work 3 days a week with a  and walks frequently. Continue current care and f/u in 6 months, sooner as needed.       Jean Marie David MD

## 2020-06-17 NOTE — PROGRESS NOTES
Chief Complaint   Patient presents with    Follow-up    Irregular Heart Beat       1. Have you been to the ER, urgent care clinic since your last visit? Hospitalized since your last visit? No    2. Have you seen or consulted any other health care providers outside of the [address] [place] [address] since your last visit? Include any pap smears or colon screening. No    Patient had COVID testing last week due to sinus respiratory issue he has no complaints.

## 2020-06-18 ENCOUNTER — TELEPHONE (OUTPATIENT)
Dept: CARDIOLOGY CLINIC | Age: 75
End: 2020-06-18

## 2020-06-18 ENCOUNTER — OFFICE VISIT (OUTPATIENT)
Dept: CARDIOLOGY CLINIC | Age: 75
End: 2020-06-18

## 2020-06-18 VITALS
OXYGEN SATURATION: 98 % | WEIGHT: 170.6 LBS | SYSTOLIC BLOOD PRESSURE: 160 MMHG | BODY MASS INDEX: 23.88 KG/M2 | HEART RATE: 84 BPM | HEIGHT: 71 IN | RESPIRATION RATE: 16 BRPM | DIASTOLIC BLOOD PRESSURE: 80 MMHG

## 2020-06-18 DIAGNOSIS — I10 ESSENTIAL HYPERTENSION: ICD-10-CM

## 2020-06-18 DIAGNOSIS — E78.2 MIXED HYPERLIPIDEMIA: ICD-10-CM

## 2020-06-18 DIAGNOSIS — I34.0 NONRHEUMATIC MITRAL VALVE REGURGITATION: ICD-10-CM

## 2020-06-18 DIAGNOSIS — I35.0 AORTIC STENOSIS, MILD: ICD-10-CM

## 2020-06-18 DIAGNOSIS — I48.0 PAF (PAROXYSMAL ATRIAL FIBRILLATION) (HCC): Primary | ICD-10-CM

## 2020-07-07 ENCOUNTER — TELEPHONE (OUTPATIENT)
Dept: CARDIOLOGY CLINIC | Age: 75
End: 2020-07-07

## 2020-07-07 NOTE — TELEPHONE ENCOUNTER
----- Message from Felix Marshall NP sent at 7/7/2020  8:38 AM EDT -----  Javier Ferguson,    Please call the patient and inform that echocardiogram is normal, ejection fraction 60-65%. No concern for heart failure at this time. He has some diastolic dysfunction. This is where the heart muscle gets stiff over time and cannot \"relax\" properly. Seen commonly with age, patients with AF and HTN. Best is to maintain good BP control, exercise and low sodium diet. No significant progression of his aortic valve stenosis -- still mild. No other changes, keep f/u with Dr. Iker Abbott as scheduled.     Thanks,  [employer]

## 2020-09-08 RX ORDER — APIXABAN 5 MG/1
TABLET, FILM COATED ORAL
Qty: 180 TAB | Refills: 1 | Status: SHIPPED | OUTPATIENT
Start: 2020-09-08 | End: 2021-06-04

## 2020-12-15 RX ORDER — LOSARTAN POTASSIUM 25 MG/1
TABLET ORAL
Qty: 90 TAB | Refills: 3 | Status: SHIPPED | OUTPATIENT
Start: 2020-12-15 | End: 2022-01-05

## 2021-01-07 ENCOUNTER — OFFICE VISIT (OUTPATIENT)
Dept: CARDIOLOGY CLINIC | Age: 76
End: 2021-01-07
Payer: MEDICARE

## 2021-01-07 VITALS
HEIGHT: 71 IN | BODY MASS INDEX: 24.08 KG/M2 | OXYGEN SATURATION: 98 % | DIASTOLIC BLOOD PRESSURE: 60 MMHG | WEIGHT: 172 LBS | SYSTOLIC BLOOD PRESSURE: 130 MMHG | RESPIRATION RATE: 16 BRPM | HEART RATE: 70 BPM

## 2021-01-07 DIAGNOSIS — I48.0 PAF (PAROXYSMAL ATRIAL FIBRILLATION) (HCC): Primary | ICD-10-CM

## 2021-01-07 DIAGNOSIS — E78.2 MIXED HYPERLIPIDEMIA: ICD-10-CM

## 2021-01-07 DIAGNOSIS — I34.0 NONRHEUMATIC MITRAL VALVE REGURGITATION: ICD-10-CM

## 2021-01-07 DIAGNOSIS — I10 ESSENTIAL HYPERTENSION: ICD-10-CM

## 2021-01-07 DIAGNOSIS — I35.0 AORTIC STENOSIS, MILD: ICD-10-CM

## 2021-01-07 PROCEDURE — G8754 DIAS BP LESS 90: HCPCS | Performed by: INTERNAL MEDICINE

## 2021-01-07 PROCEDURE — G8510 SCR DEP NEG, NO PLAN REQD: HCPCS | Performed by: INTERNAL MEDICINE

## 2021-01-07 PROCEDURE — 99214 OFFICE O/P EST MOD 30 MIN: CPT | Performed by: INTERNAL MEDICINE

## 2021-01-07 PROCEDURE — 3017F COLORECTAL CA SCREEN DOC REV: CPT | Performed by: INTERNAL MEDICINE

## 2021-01-07 PROCEDURE — G8427 DOCREV CUR MEDS BY ELIG CLIN: HCPCS | Performed by: INTERNAL MEDICINE

## 2021-01-07 PROCEDURE — G0463 HOSPITAL OUTPT CLINIC VISIT: HCPCS | Performed by: INTERNAL MEDICINE

## 2021-01-07 PROCEDURE — G8752 SYS BP LESS 140: HCPCS | Performed by: INTERNAL MEDICINE

## 2021-01-07 PROCEDURE — G8536 NO DOC ELDER MAL SCRN: HCPCS | Performed by: INTERNAL MEDICINE

## 2021-01-07 PROCEDURE — 93010 ELECTROCARDIOGRAM REPORT: CPT | Performed by: INTERNAL MEDICINE

## 2021-01-07 PROCEDURE — 1101F PT FALLS ASSESS-DOCD LE1/YR: CPT | Performed by: INTERNAL MEDICINE

## 2021-01-07 PROCEDURE — G8420 CALC BMI NORM PARAMETERS: HCPCS | Performed by: INTERNAL MEDICINE

## 2021-01-07 PROCEDURE — 93005 ELECTROCARDIOGRAM TRACING: CPT | Performed by: INTERNAL MEDICINE

## 2021-01-07 NOTE — PROGRESS NOTES
Chief Complaint   Patient presents with    Valvular Heart Disease    Hypertension    Cholesterol Problem    Irregular Heart Beat     1. Have you been to the ER, urgent care clinic since your last visit? No Hospitalized since your last visit? No  2. Have you seen or consulted any other health care providers outside of the [address] [place] [address] since your last visit? Yes PCP  & ENT Include any pap smears or colon screening.  NO

## 2021-01-07 NOTE — LETTER
1/7/2021 Patient: Allegra Carlin YOB: 1945 Date of Visit: 1/7/2021 Courtney [place] MD 
[id number] [id number] [address] [employer] Dear Courtney [place] MD, Thank you for referring Mr. Taya Ulloa to [address] [place] [address] for evaluation. My notes for this consultation are attached. If you have questions, please do not hesitate to call me. I look forward to following your patient along with you.  
 
 
Sincerely, 
 
Aspen Lee MD

## 2021-01-07 NOTE — PROGRESS NOTES
[address] [place] [address] [facility]  [phone]     Subjective:      Evelio Epperson is a 76 y.o. male is here for routine f/u. He has a PMHx of PAF, aortic stenosis, HTN and HLD. He denies chest pain/ shortness of breath, orthopnea, PND, LE edema, palpitations, syncope, or presyncope. He is exercising routinely, able to do his activities he likes to do without symptoms.      Patient Active Problem List    Diagnosis Date Noted    BCC (basal cell carcinoma of skin)     Irregular heart beat 09/21/2016    PAF (paroxysmal atrial fibrillation) ([address] 02/25/2016    Aortic stenosis, mild 02/25/2016    Mitral regurgitation 07/06/2015    Colon polyps 03/13/2015    Atrial fibrillation ([address] 05/17/2012    Mixed hyperlipidemia 05/17/2012    Atrial flutter ([address] 03/06/2012    Normal coronary arteries 02/02/2012    Atrial fibrillation with RVR ([address] 01/09/2012    Hallux valgus (acquired) 01/05/2012    Anemia 07/20/2011    Hyperlipidemia 07/19/2011    BPH (benign prostatic hyperplasia) 07/19/2011      Gabi Reyna MD  Past Medical History:   Diagnosis Date    Anemia 7/20/2011    Arrhythmia     A-flutter    Atrial fibrillation (HCC)     Atrial flutter (HCC)     BCC (basal cell carcinoma of skin)     BPH (benign prostatic hyperplasia) 7/19/2011    Colon polyps 3/13/2015    Tubular adenomas    Essential hypertension     Hyperlipidemia 7/19/2011    Long term current use of anticoagulant therapy     Other acute and subacute form of ischemic heart disease     Other ill-defined conditions(799.89) 1996    fractured ribs     Palpitations       Past Surgical History:   Procedure Laterality Date    CARDIAC CATHETERIZATION  2/2/2012         HX HERNIA REPAIR  12/07/2016    RIHR WITH MESH    HX ORTHOPAEDIC  2011    left bunionectomy and hammertoe repair    HX OTHER SURGICAL      cardiac ablation 3/7/12    HX TONSILLECTOMY      NV CARDIAC SURG PROCEDURE UNLIST      cardiac cath     No Known Allergies   Family History   Problem Relation Age of Onset    Heart Disease Mother     Cancer Sister     Cancer Sister       Social History     Socioeconomic History    Marital status:      Spouse name: Not on file    Number of children: Not on file    Years of education: Not on file    Highest education level: Not on file   Occupational History    Not on file   Social Needs    Financial resource strain: Not on file    Food insecurity     Worry: Not on file     Inability: Not on file    Transportation needs     Medical: Not on file     Non-medical: Not on file   Tobacco Use    Smoking status: Former Smoker     Quit date: 1985     Years since quittin.9    Smokeless tobacco: Never Used   Substance and Sexual Activity    Alcohol use:  Yes     Alcohol/week: 11.7 standard drinks     Types: 14 Glasses of wine per week    Drug use: No    Sexual activity: Not on file   Lifestyle    Physical activity     Days per week: Not on file     Minutes per session: Not on file    Stress: Not on file   Relationships    Social connections     Talks on phone: Not on file     Gets together: Not on file     Attends Taoist service: Not on file     Active member of club or organization: Not on file     Attends meetings of clubs or organizations: Not on file     Relationship status: Not on file    Intimate partner violence     Fear of current or ex partner: Not on file     Emotionally abused: Not on file     Physically abused: Not on file     Forced sexual activity: Not on file   Other Topics Concern    Not on file   Social History Narrative    Not on file      Current Outpatient Medications   Medication Sig    montelukast (SINGULAIR) 10 mg tablet TAKE 1 TABLET BY MOUTH EVERY DAY    losartan (COZAAR) 25 mg tablet TAKE 1 TABLET BY MOUTH EVERY DAY    Eliquis 5 mg tablet TAKE 1 TABLET BY MOUTH TWICE A DAY    atorvastatin (LIPITOR) 10 mg tablet TAKE 1 TABLET DAILY    flecainide (TAMBOCOR) 100 mg tablet TAKE 1 TABLET TWICE A DAY    dilTIAZem CD (CARDIZEM CD) 180 mg ER capsule TAKE 1 CAPSULE DAILY    guaiFENesin-dextromethorphan SR (MUCINEX DM) 600-30 mg per tablet Take 1 Tab by mouth as needed.  ciprofloxacin HCl (CIPRO) 500 mg tablet TAKE 1(ONE) TABLET BY MOUTH 2 TIMES A DAY. (Patient taking differently: PRN)    ergocalciferol, vitamin d2, (VITAMIN D) 400 unit Cap Take  by mouth.  coenzyme q10 (CO Q-10) 10 mg Cap Take  by mouth daily.  tadalafil (CIALIS) 5 mg tablet Take 5 mg by mouth daily. No current facility-administered medications for this visit. Review of Symptoms:  11 systems reviewed, negative other than as stated in the HPI    Physical ExamPhysical Exam:    Vitals:    01/07/21 0917   BP: 130/60   Pulse: 70   Resp: 16   SpO2: 98%   Weight: 172 lb (78 kg)   Height: 5' 11\" (1.803 m)     Body mass index is 23.99 kg/m². General PE  Gen:  NAD  Mental Status - Alert. General Appearance - Not in acute distress. HEENT:  PERRL, no carotid bruits or JVD  Chest and Lung Exam   Inspection: Accessory muscles - No use of accessory muscles in breathing. Auscultation:   Breath sounds: - Normal.   Cardiovascular   Inspection: Jugular vein - Bilateral - Inspection Normal.   Palpation/Percussion:   Apical Impulse: - Normal.   Auscultation: Rhythm - Regular. Heart Sounds - S1 WNL and S2 WNL. No S3 or S4. Murmurs & Other Heart Sounds: Auscultation of the heart reveals -  3/6 MANSOOR  Peripheral Vascular   Upper Extremity: Inspection - Bilateral - No Cyanotic nailbeds or Digital clubbing. Lower Extremity:   Palpation: Edema - Bilateral - No edema. Abdomen:   Soft, non-tender, bowel sounds are active.   Neuro: A&O times 3, CN and motor grossly WNL    Labs:   Lab Results   Component Value Date/Time    Cholesterol, total 135 12/28/2018 03:47 PM    Cholesterol, total 158 11/29/2012 09:03 AM    HDL Cholesterol 62 12/28/2018 03:47 PM    HDL Cholesterol 71 11/29/2012 09:03 AM LDL, calculated 53 12/28/2018 03:47 PM    LDL, calculated 77 11/29/2012 09:03 AM    Triglyceride 98 12/28/2018 03:47 PM    Triglyceride 52 11/29/2012 09:03 AM     Lab Results   Component Value Date/Time     01/09/2012 07:50 AM     Lab Results   Component Value Date/Time    Sodium 131 (L) 08/05/2019 10:04 AM    Potassium 4.8 08/05/2019 10:04 AM    Chloride 92 (L) 08/05/2019 10:04 AM    CO2 27 08/05/2019 10:04 AM    Anion gap 7 12/05/2016 08:31 AM    Glucose 77 08/05/2019 10:04 AM    BUN 15 08/05/2019 10:04 AM    Creatinine 0.68 (L) 08/05/2019 10:04 AM    BUN/Creatinine ratio 22 08/05/2019 10:04 AM    GFR est  08/05/2019 10:04 AM    GFR est non-AA 95 08/05/2019 10:04 AM    Calcium 9.3 08/05/2019 10:04 AM    Bilirubin, total 0.4 08/05/2019 10:04 AM    Alk. phosphatase 37 (L) 08/05/2019 10:04 AM    Protein, total 6.2 08/05/2019 10:04 AM    Albumin 4.5 08/05/2019 10:04 AM    Globulin 3.3 01/09/2012 07:50 AM    A-G Ratio 2.6 (H) 08/05/2019 10:04 AM    ALT (SGPT) 28 08/05/2019 10:04 AM       EKG:  SR     Assessment:     Assessment:      1. PAF (paroxysmal atrial fibrillation) ([address]    2. Aortic stenosis, mild    3. Essential hypertension    4. Nonrheumatic mitral valve regurgitation    5. Mixed hyperlipidemia        Orders Placed This Encounter    AMB POC EKG ROUTINE W/ 12 LEADS, INTER & REP     Order Specific Question:   Reason for Exam:     Answer:   routine        Plan:     PAF  · No recurrence; maintaining sinus rhythm. Remains asymptomatic  · Continue with Eliquis for stroke prevention  · Continue diltiazem for rate control, Flecainide 100 mg BID  · He has never had renal dysfunction, and understands that flecainide is renally metabolized and may need adjustment or discontinuation if he is ever dehydrated or has a decline in kidney function. He will make sure his PCP checks his kidney function and discuss with him in the near future.   · He points out that his PCP previously said he is mildly anemic with a hemoglobin of 11 in 2019. This is relatively stable from a hemoglobin of 13 and 2013. He states he has had his GI evaluation within the last 3 to 4 years and there is nothing of concern. Advised to discuss with PCP, and that he may need stool testing and/or GI evaluation plus or minus iron replacement therapy.     Aortic stenosis, mild  Per echo mild AS, EF 55-60%  7/2020  Remains asymptomatic     Nonrheumatic mitral valve regurgitation  Mild MR with preserved ejection fraction 7/2020  Continue afterload reduction     HTN  Well controlled. Continue current anti hypertensive regimen     HLD  On statin. Lipids and labs followed by PCP.         Counseled on diet and exercise- eventual goal of 30-60 minutes 5-7 times a week as per AHA guidelines. Exercising at the gym with precautions again. Continue current care and f/u in 6 months, sooner as needed.       Arian Zimmerman MD

## 2021-07-15 ENCOUNTER — OFFICE VISIT (OUTPATIENT)
Dept: CARDIOLOGY CLINIC | Age: 76
End: 2021-07-15
Payer: MEDICARE

## 2021-07-15 VITALS
HEIGHT: 71 IN | RESPIRATION RATE: 16 BRPM | OXYGEN SATURATION: 99 % | BODY MASS INDEX: 23.85 KG/M2 | DIASTOLIC BLOOD PRESSURE: 62 MMHG | SYSTOLIC BLOOD PRESSURE: 136 MMHG | HEART RATE: 63 BPM | WEIGHT: 170.4 LBS

## 2021-07-15 DIAGNOSIS — E78.2 MIXED HYPERLIPIDEMIA: ICD-10-CM

## 2021-07-15 DIAGNOSIS — I48.0 PAF (PAROXYSMAL ATRIAL FIBRILLATION) (HCC): Primary | ICD-10-CM

## 2021-07-15 DIAGNOSIS — I35.0 AORTIC STENOSIS, MILD: ICD-10-CM

## 2021-07-15 PROCEDURE — 93005 ELECTROCARDIOGRAM TRACING: CPT | Performed by: INTERNAL MEDICINE

## 2021-07-15 PROCEDURE — 3017F COLORECTAL CA SCREEN DOC REV: CPT | Performed by: INTERNAL MEDICINE

## 2021-07-15 PROCEDURE — G8427 DOCREV CUR MEDS BY ELIG CLIN: HCPCS | Performed by: INTERNAL MEDICINE

## 2021-07-15 PROCEDURE — 99214 OFFICE O/P EST MOD 30 MIN: CPT | Performed by: INTERNAL MEDICINE

## 2021-07-15 PROCEDURE — G8536 NO DOC ELDER MAL SCRN: HCPCS | Performed by: INTERNAL MEDICINE

## 2021-07-15 PROCEDURE — 1101F PT FALLS ASSESS-DOCD LE1/YR: CPT | Performed by: INTERNAL MEDICINE

## 2021-07-15 PROCEDURE — 93010 ELECTROCARDIOGRAM REPORT: CPT | Performed by: INTERNAL MEDICINE

## 2021-07-15 PROCEDURE — G8752 SYS BP LESS 140: HCPCS | Performed by: INTERNAL MEDICINE

## 2021-07-15 PROCEDURE — G0463 HOSPITAL OUTPT CLINIC VISIT: HCPCS | Performed by: INTERNAL MEDICINE

## 2021-07-15 PROCEDURE — G8432 DEP SCR NOT DOC, RNG: HCPCS | Performed by: INTERNAL MEDICINE

## 2021-07-15 PROCEDURE — G8754 DIAS BP LESS 90: HCPCS | Performed by: INTERNAL MEDICINE

## 2021-07-15 PROCEDURE — G8420 CALC BMI NORM PARAMETERS: HCPCS | Performed by: INTERNAL MEDICINE

## 2021-07-15 NOTE — LETTER
7/29/2021    Patient: Carlos Miller   YOB: 1945   Date of Visit: 7/15/2021     Ashley Munoz MD  [address] [facility]    Dear Ashley Munoz MD,      Thank you for referring Mr. Phil Davalos to [address] [place] [address] for evaluation. My notes for this consultation are attached. If you have questions, please do not hesitate to call me. I look forward to following your patient along with you.       Sincerely,    Geovanna Reyna MD

## 2021-07-15 NOTE — PROGRESS NOTES
Luis Gardner, [facility]    Subjective/HPI:     Oliva Tucker is a 76 y.o. male is here for routine f/u. He has a PMHx of PAF, aortic stenosis, HTN and HLD. Doing well. Denies complaints of chest pains, dizziness, orthopnea, PND or edema. Denies shortness of breath symptoms. Does a lot of walking regularly. Has no symptoms with exertion. Past Medical History:   Diagnosis Date    Anemia 2011    Arrhythmia     A-flutter    Atrial fibrillation (HCC)     Atrial flutter (HCC)     BCC (basal cell carcinoma of skin)     BPH (benign prostatic hyperplasia) 2011    Colon polyps 3/13/2015    Tubular adenomas    Essential hypertension     Hyperlipidemia 2011    Long term current use of anticoagulant therapy     Murmur     Other acute and subacute form of ischemic heart disease     Other ill-defined conditions(799.89)     fractured ribs     Palpitations     Valvular heart disease        Past Surgical History:   Procedure Laterality Date    CARDIAC CATHETERIZATION  2012         HX HERNIA REPAIR  2016    RIHR WITH MESH    HX ORTHOPAEDIC      left bunionectomy and hammertoe repair    HX OTHER SURGICAL      cardiac ablation 3/7/12    HX TONSILLECTOMY      [place] CARDIAC SURG PROCEDURE UNLIST      cardiac cath       Family History   Problem Relation Age of Onset    Heart Disease Mother     Cancer Sister     Cancer Sister        Social History     Socioeconomic History    Marital status:      Spouse name: Not on file    Number of children: Not on file    Years of education: Not on file    Highest education level: Not on file   Occupational History    Not on file   Tobacco Use    Smoking status: Former Smoker     Types: Cigarettes     Quit date: 1985     Years since quittin.4    Smokeless tobacco: Never Used   Vaping Use    Vaping Use: Never used   Substance and Sexual Activity    Alcohol use:  Yes     Alcohol/week: 11.7 standard drinks     Types: 14 Glasses of wine per week     Comment: 2 glasses of wine nightly with dinner    Drug use: No    Sexual activity: Not on file   Other Topics Concern    Not on file   Social History Narrative    Not on file     Social Determinants of Health     Financial Resource Strain:     Difficulty of Paying Living Expenses:    Food Insecurity:     Worried About Running Out of Food in the Last Year:     [address] in the Last Year:    Transportation Needs:     Lack of Transportation (Medical):  Lack of Transportation (Non-Medical):    Physical Activity:     Days of Exercise per Week:     Minutes of Exercise per Session:    Stress:     Feeling of Stress :    Social Connections:     Frequency of Communication with Friends and Family:     Frequency of Social Gatherings with Friends and Family:     Attends [employer] Services:     Active Member of Clubs or Organizations:     Attends Club or Organization Meetings:     Marital Status:    Intimate Partner Violence:     Fear of Current or Ex-Partner:     Emotionally Abused:     Physically Abused:     Sexually Abused:        No Known Allergies    Current Outpatient Medications on File Prior to Visit   Medication Sig Dispense Refill    Eliquis 5 mg tablet TAKE 1 TABLET BY MOUTH TWICE A  Tablet 2    atorvastatin (LIPITOR) 10 mg tablet TAKE 1 TABLET BY MOUTH EVERY DAY 90 Tab 3    dilTIAZem ER (CARDIZEM CD) 180 mg capsule TAKE 1 CAPSULE BY MOUTH EVERY DAY 90 Cap 3    montelukast (SINGULAIR) 10 mg tablet TAKE 1 TABLET BY MOUTH EVERY DAY 90 Tab 2    losartan (COZAAR) 25 mg tablet TAKE 1 TABLET BY MOUTH EVERY DAY 90 Tab 3    flecainide (TAMBOCOR) 100 mg tablet TAKE 1 TABLET TWICE A  Tab 3    guaiFENesin-dextromethorphan SR (MUCINEX DM) 600-30 mg per tablet Take 1 Tab by mouth as needed.  ergocalciferol, vitamin d2, (VITAMIN D) 400 unit Cap Take  by mouth daily.       coenzyme q10 (CO Q-10) 10 mg Cap Take  by mouth daily. No current facility-administered medications on file prior to visit. Review of Symptoms:    Review of Systems   Constitutional: Negative for chills, fever and weight loss. HENT: Negative for nosebleeds. Eyes: Negative for blurred vision and double vision. Respiratory: Negative for cough, shortness of breath and wheezing. Cardiovascular: Negative for chest pain, palpitations, orthopnea, leg swelling and PND. Skin: Negative for rash. Neurological: Negative for dizziness and loss of consciousness. Physical Exam:      General: Well developed, in no acute distress, cooperative and alert  Heart:  reg rate and rhythm; normal S1/S2; no murmurs, no gallops or rubs. Respiratory: Clear bilaterally x 4, no wheezing or rales  Extremities:  Normal cap refill, no cyanosis, atraumatic. No edema.   Vascular: 2+ pulses symmetric in all extremities    Vitals:    07/15/21 1109 07/15/21 1120   BP: (!) 140/60 136/62   BP 1 Location: Left upper arm Right upper arm   BP Patient Position: Sitting Sitting   BP Cuff Size: Adult Adult   Pulse: 63    Resp: 16    Height: 5' 11\" (1.803 m)    Weight: 170 lb 6.4 oz (77.3 kg)    SpO2: 99%        Lab Results   Component Value Date/Time    Cholesterol, total 155 02/10/2021 09:00 AM    Cholesterol (POC) 136 08/05/2019 09:43 AM    HDL Cholesterol 60 02/10/2021 09:00 AM    HDL Cholesterol (POC) 64 08/05/2019 09:43 AM    LDL Cholesterol (POC) n/a 08/05/2019 09:43 AM    LDL, calculated 85 02/10/2021 09:00 AM    LDL, calculated 53 12/28/2018 03:47 PM    VLDL, calculated 10 02/10/2021 09:00 AM    VLDL, calculated 20 12/28/2018 03:47 PM    Triglyceride 44 02/10/2021 09:00 AM    Triglycerides (POC) <45 08/05/2019 09:43 AM     Lab Results   Component Value Date/Time    WBC 5.1 02/10/2021 09:00 AM    HGB (POC) 11.2 08/05/2019 09:43 AM    HGB 12.2 (L) 02/10/2021 09:00 AM    HCT (POC) 33.8 (A) 08/05/2019 09:43 AM    HCT 37.7 02/10/2021 09:00 AM    PLATELET 054 89/62/0604 09:00 AM    MCV 92 02/10/2021 09:00 AM     Lab Results   Component Value Date/Time    Sodium 136 02/10/2021 09:00 AM    Potassium 4.5 02/10/2021 09:00 AM    Chloride 98 02/10/2021 09:00 AM    CO2 23 02/10/2021 09:00 AM    Anion gap 7 12/05/2016 08:31 AM    Glucose 87 02/10/2021 09:00 AM    BUN 20 02/10/2021 09:00 AM    Creatinine 0.82 02/10/2021 09:00 AM    BUN/Creatinine ratio 24 02/10/2021 09:00 AM    GFR est  02/10/2021 09:00 AM    GFR est non-AA 87 02/10/2021 09:00 AM    Calcium 9.7 02/10/2021 09:00 AM    Bilirubin, total 0.4 02/10/2021 09:00 AM    Alk. phosphatase 49 02/10/2021 09:00 AM    Protein, total 6.4 02/10/2021 09:00 AM    Albumin 4.6 02/10/2021 09:00 AM    Globulin 3.3 01/09/2012 07:50 AM    A-G Ratio 2.6 (H) 02/10/2021 09:00 AM    ALT (SGPT) 28 02/10/2021 09:00 AM    AST (SGOT) 34 02/10/2021 09:00 AM       ECG done today sinus bradycardia, QTc 387 ms     Assessment:       ICD-10-CM ICD-9-CM    1. PAF (paroxysmal atrial fibrillation) ([facility])  I48.0 427.31 AMB POC EKG ROUTINE W/ 12 LEADS, INTER & REP   2. Aortic stenosis, mild  I35.0 424.1 AMB POC EKG ROUTINE W/ 12 LEADS, INTER & REP   3. Mixed hyperlipidemia  E78.2 272.2 AMB POC EKG ROUTINE W/ 12 LEADS, INTER & REP        Plan:     1. PAF (paroxysmal atrial fibrillation) ([facility])  Maintaining sinus rhythm  Continue flecainide, diltiazem  On Eliquis 5 mg BID    2. Aortic stenosis, mild  Echo done 7/2020 with mild AS, preserved LVEF 60-65% with grade 2 DD, mild MR  Continue present management  Repeat echo in 7/2022    3. Mixed hyperlipidemia  Continue statin therapy and low fat, low cholesterol diet  Lipids managed by PCP    4. History of normal coronary arteries    5. Counseled on diet and exercise- eventual goal of 30-60 minutes 5-7 times a week as per AHA guidelines. F/u with Dr. Rita Cavazos in 6 months. Patient seen and examined by me with the above nurse practitioner.   I personally performed all components of the history, physical, and medical decision making and agree with the assessment and plan with minor modifications as noted. Today the patient presents with stable history of PAF, mild AS, mixed hyperlipidemia. Exercising regularly. General PE  Gen:  NAD  Mental Status - Alert. General Appearance - Not in acute distress. HEENT:  PERRL, no carotid bruits or JVD  Chest and Lung Exam   Inspection: Accessory muscles - No use of accessory muscles in breathing. Auscultation:   Breath sounds: - Normal.   Cardiovascular   Inspection: Jugular vein - Bilateral - Inspection Normal.   Palpation/Percussion:   Apical Impulse: - Normal.   Auscultation: Rhythm - Regular. Heart Sounds - S1 WNL and S2 WNL. No S3 or S4. Murmurs & Other Heart Sounds: Auscultation of the heart reveals - No Murmurs. Peripheral Vascular   Upper Extremity: Inspection - Bilateral - No Cyanotic nailbeds or Digital clubbing. Lower Extremity:   Palpation: Edema - Bilateral - No edema. Abdomen:   Soft, non-tender, bowel sounds are active. Neuro: A&O times 3, CN and motor grossly WNL    Continue current cardiac care. Encouraged continued healthy diet and exercise. Follow up in 6 months, sooner as needed.

## 2021-07-15 NOTE — PROGRESS NOTES
1. Have you been to the ER, urgent care clinic since your last visit? Hospitalized since your last visit? No.    2. Have you seen or consulted any other health care providers outside of the [address] [place] [address] since your last visit? Include any pap smears or colon screening.    No.        Chief Complaint   Patient presents with    Irregular Heart Beat    Hypertension     6 month- pt denies any cardiac symptoms

## 2021-08-16 ENCOUNTER — TELEPHONE (OUTPATIENT)
Dept: CARDIOLOGY CLINIC | Age: 76
End: 2021-08-16

## 2021-08-16 NOTE — TELEPHONE ENCOUNTER
Has had the covid vaccine questions if should get the booster when comes out stated that I didn't see the need but wanted to check with you please advise thanks

## 2021-09-17 ENCOUNTER — TELEPHONE (OUTPATIENT)
Dept: CARDIOLOGY CLINIC | Age: 76
End: 2021-09-17

## 2021-09-17 RX ORDER — FLECAINIDE ACETATE 100 MG/1
TABLET ORAL
Qty: 180 TABLET | Refills: 3 | Status: SHIPPED | OUTPATIENT
Start: 2021-09-17 | End: 2022-07-12

## 2021-09-17 NOTE — TELEPHONE ENCOUNTER
PT needs a refill on his prescription for fleainide 100 mg. ,Pharmacy sent request it today.  Please call him at [phone]       Thanks Kami

## 2022-01-25 NOTE — PROGRESS NOTES
[address] [facility], [address] [facility]  [phone]     Subjective:      Apple Manzano is a 68 y.o. male is here for routine f/u. He has a PMHx of PAF, aortic stenosis, HTN and HLD. Last OV 7/15/2021:     Does a lot of walking regularly. Has no symptoms with exertion. Today, he continues to feel great. He is little bit concerned because he has an acquaintance that had atrial fibrillation and was taking aspirin but then had significant stroke requiring neuro intervention and TPA. .  We discussed the superiority of Eliquis in stroke prevention in patients with atrial fibrillation and significant risk factors. The patient denies chest pain/ shortness of breath, orthopnea, PND, LE edema, palpitations, syncope, or presyncope. Cardiac work up    Echo 7/2020  · Normal cavity size, wall thickness and systolic function (ejection fraction normal). Estimated left ventricular ejection fraction is 60 - 65%. No regional wall motion abnormality noted. Moderate (grade 2) left ventricular diastolic dysfunction. · Mild aortic valve leaflet calcification present with reduced excursion. Mild aortic valve stenosis is present. · Mild mitral valve regurgitation is present. · Pulmonary arterial systolic pressure is 23 mmHg.          Patient Active Problem List    Diagnosis Date Noted    BCC (basal cell carcinoma of skin)     Irregular heart beat 09/21/2016    PAF (paroxysmal atrial fibrillation) ([address] 02/25/2016    Aortic stenosis, mild 02/25/2016    Mitral regurgitation 07/06/2015    Colon polyps 03/13/2015    Mixed hyperlipidemia 05/17/2012    Atrial flutter ([address] 03/06/2012    Normal coronary arteries 02/02/2012    Atrial fibrillation with RVR ([address] 01/09/2012    Hallux valgus (acquired) 01/05/2012    Anemia 07/20/2011    Hyperlipidemia 07/19/2011    BPH (benign prostatic hyperplasia) 07/19/2011      Patrick Fiore MD  Past Medical History:   Diagnosis Date    Anemia 2011    Arrhythmia     A-flutter    Atrial fibrillation (HCC)     Atrial flutter (HCC)     BCC (basal cell carcinoma of skin)     BPH (benign prostatic hyperplasia) 2011    Colon polyps 3/13/2015    Tubular adenomas    Essential hypertension     Hyperlipidemia 2011    Long term current use of anticoagulant therapy     Murmur     Other acute and subacute form of ischemic heart disease     Other ill-defined conditions(799.89)     fractured ribs     Palpitations     Valvular heart disease       Past Surgical History:   Procedure Laterality Date    CARDIAC CATHETERIZATION  2012         HX HEART CATHETERIZATION      HX HERNIA REPAIR  2016    RIHR WITH MESH    HX ORTHOPAEDIC      left bunionectomy and hammertoe repair    HX OTHER SURGICAL      cardiac ablation 3/7/12    HX TONSILLECTOMY      [place] CARDIAC SURG PROCEDURE UNLIST      cardiac cath     No Known Allergies   Family History   Problem Relation Age of Onset    Heart Disease Mother     Cancer Sister     Cancer Sister       Social History     Socioeconomic History    Marital status:      Spouse name: Not on file    Number of children: Not on file    Years of education: Not on file    Highest education level: Not on file   Occupational History    Not on file   Tobacco Use    Smoking status: Former Smoker     Packs/day: 1.00     Years: 15.00     Pack years: 15.00     Types: Cigarettes     Quit date: 1985     Years since quittin.0    Smokeless tobacco: Never Used   Vaping Use    Vaping Use: Never used   Substance and Sexual Activity    Alcohol use:  Yes     Alcohol/week: 11.7 standard drinks     Types: 14 Glasses of wine per week     Comment: 2 glasses of wine nightly with dinner    Drug use: No    Sexual activity: Not on file   Other Topics Concern    Not on file   Social History Narrative    Not on file     Social Determinants of Health     Financial Resource Strain:     Difficulty of Paying Living Expenses: Not on file   Food Insecurity:     Worried About Running Out of Food in the Last Year: Not on file    Ran Out of Food in the Last Year: Not on file   Transportation Needs:     Lack of Transportation (Medical): Not on file    Lack of Transportation (Non-Medical): Not on file   Physical Activity:     Days of Exercise per Week: Not on file    Minutes of Exercise per Session: Not on file   Stress:     Feeling of Stress : Not on file   Social Connections:     Frequency of Communication with Friends and Family: Not on file    Frequency of Social Gatherings with Friends and Family: Not on file    Attends [employer] Services: Not on file    Active Member of Clubs or Organizations: Not on file    Attends Club or Organization Meetings: Not on file    Marital Status: Not on file   Intimate Partner Violence:     Fear of Current or Ex-Partner: Not on file    Emotionally Abused: Not on file    Physically Abused: Not on file    Sexually Abused: Not on file   Housing Stability:     Unable to Pay for Housing in the Last Year: Not on file    Number of Places Lived in the Last Year: Not on file    Unstable Housing in the Last Year: Not on file      Current Outpatient Medications   Medication Sig    losartan (COZAAR) 25 mg tablet TAKE 1 TABLET BY MOUTH EVERY DAY    montelukast (SINGULAIR) 10 mg tablet TAKE 1 TABLET BY MOUTH EVERY DAY    flecainide (TAMBOCOR) 100 mg tablet TAKE 1 TABLET BY MOUTH TWICE A DAY    Eliquis 5 mg tablet TAKE 1 TABLET BY MOUTH TWICE A DAY    atorvastatin (LIPITOR) 10 mg tablet TAKE 1 TABLET BY MOUTH EVERY DAY    dilTIAZem ER (CARDIZEM CD) 180 mg capsule TAKE 1 CAPSULE BY MOUTH EVERY DAY    guaiFENesin-dextromethorphan SR (MUCINEX DM) 600-30 mg per tablet Take 1 Tab by mouth as needed.  ergocalciferol, vitamin d2, (VITAMIN D) 400 unit Cap Take  by mouth daily.  coenzyme q10 (CO Q-10) 10 mg Cap Take  by mouth daily.        No current facility-administered medications for this visit. Review of Symptoms:  11 systems reviewed, negative other than as stated in the HPI    Physical ExamPhysical Exam:    Vitals:    01/26/22 1404 01/26/22 1412   BP: (!) 156/80 (!) 160/78   Pulse: 64    Resp: 18    SpO2: 100%    Weight: 172 lb 9.6 oz (78.3 kg)    Height: 5' 11\" (1.803 m)      Body mass index is 24.07 kg/m². General PE  Gen:  NAD  Mental Status - Alert. General Appearance - Not in acute distress. HEENT:  PERRL, no carotid bruits or JVD  Chest and Lung Exam   Inspection: Accessory muscles - No use of accessory muscles in breathing. Auscultation:   Breath sounds: - Normal.   Cardiovascular   Inspection: Jugular vein - Bilateral - Inspection Normal.   Palpation/Percussion:   Apical Impulse: - Normal.   Auscultation: Rhythm - Regular. Heart Sounds - S1 WNL and S2 WNL. No S3 or S4. Murmurs & Other Heart Sounds: Auscultation of the heart reveals - No Murmurs. Peripheral Vascular   Upper Extremity: Inspection - Bilateral - No Cyanotic nailbeds or Digital clubbing. Lower Extremity:   Palpation: Edema - Bilateral - No edema. Abdomen:   Soft, non-tender, bowel sounds are active.   Neuro: A&O times 3, CN and motor grossly WNL    Labs:   Lab Results   Component Value Date/Time    Cholesterol, total 155 02/10/2021 09:00 AM    Cholesterol, total 135 12/28/2018 03:47 PM    Cholesterol, total 158 11/29/2012 09:03 AM    HDL Cholesterol 60 02/10/2021 09:00 AM    HDL Cholesterol 62 12/28/2018 03:47 PM    HDL Cholesterol 71 11/29/2012 09:03 AM    LDL, calculated 85 02/10/2021 09:00 AM    LDL, calculated 53 12/28/2018 03:47 PM    LDL, calculated 77 11/29/2012 09:03 AM    Triglyceride 44 02/10/2021 09:00 AM    Triglyceride 98 12/28/2018 03:47 PM    Triglyceride 52 11/29/2012 09:03 AM     Lab Results   Component Value Date/Time     01/09/2012 07:50 AM     Lab Results   Component Value Date/Time    Sodium 136 02/10/2021 09:00 AM    Potassium 4.5 02/10/2021 09:00 AM Chloride 98 02/10/2021 09:00 AM    CO2 23 02/10/2021 09:00 AM    Anion gap 7 12/05/2016 08:31 AM    Glucose 87 02/10/2021 09:00 AM    BUN 20 02/10/2021 09:00 AM    Creatinine 0.82 02/10/2021 09:00 AM    BUN/Creatinine ratio 24 02/10/2021 09:00 AM    GFR est  02/10/2021 09:00 AM    GFR est non-AA 87 02/10/2021 09:00 AM    Calcium 9.7 02/10/2021 09:00 AM    Bilirubin, total 0.4 02/10/2021 09:00 AM    Alk. phosphatase 49 02/10/2021 09:00 AM    Protein, total 6.4 02/10/2021 09:00 AM    Albumin 4.6 02/10/2021 09:00 AM    Globulin 3.3 01/09/2012 07:50 AM    A-G Ratio 2.6 (H) 02/10/2021 09:00 AM    ALT (SGPT) 28 02/10/2021 09:00 AM       EKG:  NSR      Assessment:     Assessment:        ICD-10-CM ICD-9-CM    1. PAF (paroxysmal atrial fibrillation) ([facility])  I48.0 427.31    2. Aortic stenosis, mild  I35.0 424.1    3. Mixed hyperlipidemia  E78.2 272.2    4. Essential hypertension  I10 401.9 AMB POC EKG ROUTINE W/ 12 LEADS, INTER & REP   5. Nonrheumatic mitral valve regurgitation  I34.0 424.0        Orders Placed This Encounter    AMB POC EKG ROUTINE W/ 12 LEADS, INTER & REP     Order Specific Question:   Reason for Exam:     Answer:   routine        Plan:     PAF (paroxysmal atrial fibrillation) ([facility]  Echo done 7/2020 with mild AS, preserved LVEF 60-65% with grade 2 DD, mild MR  Maintaining sinus rhythm  Continue flecainide 100 mg BID  Continue Diltiazem 180 mg daily  Continue Eliquis 5 mg BID  Will request recent labs from PCP     Aortic stenosis, mild  Echo done 7/2020 with mild AS, preserved LVEF 60-65% with grade 2 DD, mild MR  Continue present management  Repeat echo in 7/2022     Mixed hyperlipidemia  On Atorva 10 mg daily  Continue statin therapy and low fat, low cholesterol diet  Lipids managed by PCP     Hypertension:  Mildly elevated on initial check today. Same on repeat. Reviewed last office visits and blood pressure was normal.  The patient will monitor BP at home and call if generally >140/85. History of normal coronary arteries     Counseled on diet and exercise- eventual goal of 30-60 minutes 5-7 times a week as per AHA guidelines.         Continue current care and f/u in 6 months. Please note that the nurse practitioner helped with precharting, but did not see the patient today, and the entirety of the history, physical, and assessment and plan is mine.      Valentina Dukes MD

## 2022-01-26 ENCOUNTER — OFFICE VISIT (OUTPATIENT)
Dept: CARDIOLOGY CLINIC | Age: 77
End: 2022-01-26
Payer: MEDICARE

## 2022-01-26 ENCOUNTER — TELEPHONE (OUTPATIENT)
Dept: CARDIOLOGY CLINIC | Age: 77
End: 2022-01-26

## 2022-01-26 VITALS
DIASTOLIC BLOOD PRESSURE: 78 MMHG | RESPIRATION RATE: 18 BRPM | HEART RATE: 64 BPM | BODY MASS INDEX: 24.16 KG/M2 | WEIGHT: 172.6 LBS | HEIGHT: 71 IN | OXYGEN SATURATION: 100 % | SYSTOLIC BLOOD PRESSURE: 160 MMHG

## 2022-01-26 DIAGNOSIS — I34.0 NONRHEUMATIC MITRAL VALVE REGURGITATION: ICD-10-CM

## 2022-01-26 DIAGNOSIS — I10 ESSENTIAL HYPERTENSION: ICD-10-CM

## 2022-01-26 DIAGNOSIS — I35.0 AORTIC STENOSIS, MILD: ICD-10-CM

## 2022-01-26 DIAGNOSIS — I48.0 PAF (PAROXYSMAL ATRIAL FIBRILLATION) (HCC): Primary | ICD-10-CM

## 2022-01-26 DIAGNOSIS — E78.2 MIXED HYPERLIPIDEMIA: ICD-10-CM

## 2022-01-26 PROCEDURE — 93005 ELECTROCARDIOGRAM TRACING: CPT | Performed by: INTERNAL MEDICINE

## 2022-01-26 PROCEDURE — G8754 DIAS BP LESS 90: HCPCS | Performed by: INTERNAL MEDICINE

## 2022-01-26 PROCEDURE — G8536 NO DOC ELDER MAL SCRN: HCPCS | Performed by: INTERNAL MEDICINE

## 2022-01-26 PROCEDURE — G8427 DOCREV CUR MEDS BY ELIG CLIN: HCPCS | Performed by: INTERNAL MEDICINE

## 2022-01-26 PROCEDURE — G0463 HOSPITAL OUTPT CLINIC VISIT: HCPCS | Performed by: INTERNAL MEDICINE

## 2022-01-26 PROCEDURE — 99214 OFFICE O/P EST MOD 30 MIN: CPT | Performed by: INTERNAL MEDICINE

## 2022-01-26 PROCEDURE — G8753 SYS BP > OR = 140: HCPCS | Performed by: INTERNAL MEDICINE

## 2022-01-26 PROCEDURE — 93010 ELECTROCARDIOGRAM REPORT: CPT | Performed by: INTERNAL MEDICINE

## 2022-01-26 PROCEDURE — G8420 CALC BMI NORM PARAMETERS: HCPCS | Performed by: INTERNAL MEDICINE

## 2022-01-26 PROCEDURE — 1101F PT FALLS ASSESS-DOCD LE1/YR: CPT | Performed by: INTERNAL MEDICINE

## 2022-01-26 PROCEDURE — G8510 SCR DEP NEG, NO PLAN REQD: HCPCS | Performed by: INTERNAL MEDICINE

## 2022-01-26 NOTE — TELEPHONE ENCOUNTER
Called YASHIRA Michael's office getting them to verify most recent labs. Patient verified with two identifiers. Informed what is on file dated 2/10/21 is the most up today. MD Kathi Townsend and YONY Robledo Slice informed at this time.

## 2022-01-26 NOTE — PROGRESS NOTES
1. Have you been to the ER, urgent care clinic since your last visit? Hospitalized since your last visit? No    2. Have you seen or consulted any other health care providers outside of the [address] [place] [address] since your last visit? Include any pap smears or colon screening.  No

## 2022-01-26 NOTE — LETTER
1/26/2022    Patient: Radha Hernández   YOB: 1945   Date of Visit: 1/26/2022     Mariaelena Steen MD  [address] [place] [address] Hermelindo Olya    Dear Mariaelena Steen MD,      Thank you for referring Mr. True Rubinstein to [address] [place] [address] Nathaniel for evaluation. My notes for this consultation are attached. If you have questions, please do not hesitate to call me. I look forward to following your patient along with you.       Sincerely,    Mel Murray MD

## 2022-01-26 NOTE — TELEPHONE ENCOUNTER
Called and spoke to patient. Verified with two identifiers. Clarified with patient who his PCP was and if he knew off the top of his head if he has had labs after the 2/10/21 listen in chart. Patient denied having labs since then but verified his PCP being YASHIRA Michael. Bruna

## 2022-02-23 RX ORDER — APIXABAN 5 MG/1
TABLET, FILM COATED ORAL
Qty: 180 TABLET | Refills: 2 | Status: SHIPPED | OUTPATIENT
Start: 2022-02-23

## 2022-03-04 ENCOUNTER — TELEPHONE (OUTPATIENT)
Dept: CARDIOLOGY CLINIC | Age: 77
End: 2022-03-04

## 2022-03-04 NOTE — TELEPHONE ENCOUNTER
Pt is calling to let you know what his Bp was running 140/72 this morning 150/82, Dr Pernell Sharma wanted him to let you know what his BP was running to see if his BP medication needed to be adjusted.  Please call him at [phone]    Thanks Kami

## 2022-03-07 RX ORDER — LOSARTAN POTASSIUM 50 MG/1
50 TABLET ORAL DAILY
COMMUNITY
End: 2022-03-07 | Stop reason: SDUPTHER

## 2022-03-07 RX ORDER — LOSARTAN POTASSIUM 50 MG/1
50 TABLET ORAL DAILY
Qty: 90 TABLET | Refills: 1 | Status: SHIPPED | OUTPATIENT
Start: 2022-03-07 | End: 2022-08-17

## 2022-03-07 NOTE — TELEPHONE ENCOUNTER
Patient heading to [facility] for a month requesting Losartan 50 mg to take with him.

## 2022-03-11 ENCOUNTER — TELEPHONE (OUTPATIENT)
Dept: CARDIOLOGY CLINIC | Age: 77
End: 2022-03-11

## 2022-03-11 NOTE — TELEPHONE ENCOUNTER
Pt Bp is 142/68 was taking new medication for three day is this where you wanted his BP to be. Please call him back at [phone].       Thanks Kami

## 2022-07-12 RX ORDER — FLECAINIDE ACETATE 100 MG/1
TABLET ORAL
Qty: 180 TABLET | Refills: 3 | Status: SHIPPED | OUTPATIENT
Start: 2022-07-12

## 2022-08-16 ENCOUNTER — OFFICE VISIT (OUTPATIENT)
Dept: CARDIOLOGY CLINIC | Age: 77
End: 2022-08-16
Payer: MEDICARE

## 2022-08-16 ENCOUNTER — ANCILLARY PROCEDURE (OUTPATIENT)
Dept: CARDIOLOGY CLINIC | Age: 77
End: 2022-08-16
Payer: MEDICARE

## 2022-08-16 VITALS
SYSTOLIC BLOOD PRESSURE: 134 MMHG | BODY MASS INDEX: 22.68 KG/M2 | OXYGEN SATURATION: 99 % | HEART RATE: 72 BPM | WEIGHT: 162 LBS | RESPIRATION RATE: 17 BRPM | HEIGHT: 71 IN | DIASTOLIC BLOOD PRESSURE: 76 MMHG

## 2022-08-16 DIAGNOSIS — I34.0 NONRHEUMATIC MITRAL VALVE REGURGITATION: ICD-10-CM

## 2022-08-16 DIAGNOSIS — E78.2 MIXED HYPERLIPIDEMIA: ICD-10-CM

## 2022-08-16 DIAGNOSIS — I35.0 AORTIC STENOSIS, MILD: ICD-10-CM

## 2022-08-16 DIAGNOSIS — I48.0 PAF (PAROXYSMAL ATRIAL FIBRILLATION) (HCC): ICD-10-CM

## 2022-08-16 DIAGNOSIS — I10 ESSENTIAL HYPERTENSION: ICD-10-CM

## 2022-08-16 DIAGNOSIS — I48.0 PAF (PAROXYSMAL ATRIAL FIBRILLATION) (HCC): Primary | ICD-10-CM

## 2022-08-16 PROCEDURE — G8510 SCR DEP NEG, NO PLAN REQD: HCPCS | Performed by: INTERNAL MEDICINE

## 2022-08-16 PROCEDURE — G8536 NO DOC ELDER MAL SCRN: HCPCS | Performed by: INTERNAL MEDICINE

## 2022-08-16 PROCEDURE — G0463 HOSPITAL OUTPT CLINIC VISIT: HCPCS | Performed by: INTERNAL MEDICINE

## 2022-08-16 PROCEDURE — G8420 CALC BMI NORM PARAMETERS: HCPCS | Performed by: INTERNAL MEDICINE

## 2022-08-16 PROCEDURE — G8752 SYS BP LESS 140: HCPCS | Performed by: INTERNAL MEDICINE

## 2022-08-16 PROCEDURE — 1101F PT FALLS ASSESS-DOCD LE1/YR: CPT | Performed by: INTERNAL MEDICINE

## 2022-08-16 PROCEDURE — 1123F ACP DISCUSS/DSCN MKR DOCD: CPT | Performed by: INTERNAL MEDICINE

## 2022-08-16 PROCEDURE — G8754 DIAS BP LESS 90: HCPCS | Performed by: INTERNAL MEDICINE

## 2022-08-16 PROCEDURE — G8427 DOCREV CUR MEDS BY ELIG CLIN: HCPCS | Performed by: INTERNAL MEDICINE

## 2022-08-16 PROCEDURE — 99214 OFFICE O/P EST MOD 30 MIN: CPT | Performed by: INTERNAL MEDICINE

## 2022-08-16 PROCEDURE — 93005 ELECTROCARDIOGRAM TRACING: CPT | Performed by: INTERNAL MEDICINE

## 2022-08-16 PROCEDURE — 93010 ELECTROCARDIOGRAM REPORT: CPT | Performed by: INTERNAL MEDICINE

## 2022-08-16 PROCEDURE — 93306 TTE W/DOPPLER COMPLETE: CPT | Performed by: INTERNAL MEDICINE

## 2022-08-16 RX ORDER — BETAMETHASONE DIPROPIONATE 0.5 MG/G
CREAM TOPICAL
COMMUNITY
Start: 2022-05-09

## 2022-08-16 RX ORDER — DOXYCYCLINE HYCLATE 100 MG
100 TABLET ORAL 2 TIMES DAILY
COMMUNITY
Start: 2022-06-01

## 2022-08-16 NOTE — PROGRESS NOTES
Identified pt with two pt identifiers(name and ). Reviewed record in preparation for visit and have obtained necessary documentation. All patient medications has been reviewed. Chief Complaint   Patient presents with    Follow-up       3 most recent PHQ Screens 2022   Little interest or pleasure in doing things Not at all   Feeling down, depressed, irritable, or hopeless Not at all   Total Score PHQ 2 0     Abuse Screening Questionnaire 2022   Do you ever feel afraid of your partner? N   Are you in a relationship with someone who physically or mentally threatens you? N   Is it safe for you to go home? Y       Health Maintenance Due   Topic    DTaP/Tdap/Td series (2 - Td or Tdap)    Lipid Screen     Medicare Yearly Exam      Health Maintenance Review: Patient reminded of \"due or due soon\" health maintenance. I have asked the patient to contact his/her primary care provider (PCP) for follow-up on his/her health maintenance. Vitals:    22 1435   BP: (!) 142/80   Pulse: 72   Resp: 17   SpO2: 99%   Weight: 162 lb (73.5 kg)   Height: 5' 11\" (1.803 m)   PainSc:   0 - No pain       Wt Readings from Last 3 Encounters:   22 162 lb (73.5 kg)   22 172 lb 9.6 oz (78.3 kg)   07/15/21 170 lb 6.4 oz (77.3 kg)     Temp Readings from Last 3 Encounters:   02/10/21 97.1 °F (36.2 °C)   16 97.7 °F (36.5 °C)   16 97.6 °F (36.4 °C)     BP Readings from Last 3 Encounters:   22 (!) 142/80   22 (!) 160/78   07/15/21 136/62     Pulse Readings from Last 3 Encounters:   22 72   22 64   07/15/21 63       1. \"Have you been to the ER, urgent care clinic since your last visit? Hospitalized since your last visit? \" No    2. \"Have you seen or consulted any other health care providers outside of the [address] [place] [address] since your last visit? \" No

## 2022-08-16 NOTE — PROGRESS NOTES
[address], [address]  [phone]     Subjective:      Nesha Hussein is a 68 y.o. male is here for routine f/u. The patient was last seen by us 6 months ago. Today, the patient denies chest pain/ shortness of breath, orthopnea, PND, LE edema, palpitations, syncope, or presyncope.        Patient Active Problem List    Diagnosis Date Noted    BCC (basal cell carcinoma of skin)     Irregular heart beat 09/21/2016    PAF (paroxysmal atrial fibrillation) ([address] 02/25/2016    Aortic stenosis, mild 02/25/2016    Mitral regurgitation 07/06/2015    Colon polyps 03/13/2015    Mixed hyperlipidemia 05/17/2012    Atrial flutter ([address] 03/06/2012    Normal coronary arteries 02/02/2012    Atrial fibrillation with RVR ([facility] 01/09/2012    Hallux valgus (acquired) 01/05/2012    Anemia 07/20/2011    Hyperlipidemia 07/19/2011    BPH (benign prostatic hyperplasia) 07/19/2011      Louie Naik MD  Past Medical History:   Diagnosis Date    Anemia 7/20/2011    Arrhythmia     A-flutter    Atrial fibrillation (HCC)     Atrial flutter (HCC)     BCC (basal cell carcinoma of skin)     BPH (benign prostatic hyperplasia) 7/19/2011    Colon polyps 3/13/2015    Tubular adenomas    Essential hypertension     Hyperlipidemia 7/19/2011    Long term current use of anticoagulant therapy     Murmur     Other acute and subacute form of ischemic heart disease     Other ill-defined conditions(799.89) 1996    fractured ribs     Palpitations     Valvular heart disease       Past Surgical History:   Procedure Laterality Date    CARDIAC CATHETERIZATION  2/2/2012         HX HEART CATHETERIZATION      HX HERNIA REPAIR  12/07/2016    RIHR WITH MESH    HX ORTHOPAEDIC  2011    left bunionectomy and hammertoe repair    HX OTHER SURGICAL      cardiac ablation 3/7/12    HX TONSILLECTOMY      [place] CARDIAC SURG PROCEDURE UNLIST      cardiac cath     No Known Allergies   Family History   Problem Relation Age of Onset    Heart Disease Mother     Cancer Sister     Cancer Sister       Social History     Socioeconomic History    Marital status:      Spouse name: Not on file    Number of children: Not on file    Years of education: Not on file    Highest education level: Not on file   Occupational History    Not on file   Tobacco Use    Smoking status: Former     Packs/day: 1.00     Years: 15.00     Pack years: 15.00     Types: Cigarettes     Quit date: 1985     Years since quittin.5    Smokeless tobacco: Never   Vaping Use    Vaping Use: Never used   Substance and Sexual Activity    Alcohol use: Yes     Alcohol/week: 11.7 standard drinks     Types: 14 Glasses of wine per week     Comment: 2 glasses of wine nightly with dinner    Drug use: No    Sexual activity: Not on file   Other Topics Concern    Not on file   Social History Narrative    Not on file     Social Determinants of Health     Financial Resource Strain: Not on file   Food Insecurity: Not on file   Transportation Needs: Not on file   Physical Activity: Not on file   Stress: Not on file   Social Connections: Not on file   Intimate Partner Violence: Not on file   Housing Stability: Not on file      Current Outpatient Medications   Medication Sig    betamethasone dipropionate (DIPROSONE) 0.05 % topical cream APPLY CREAM TO ECZEMA ON HANDS AND LEGS TOPICALLY TWICE A DAY AS NEEDED    flecainide (TAMBOCOR) 100 mg tablet TAKE 1 TABLET BY MOUTH TWICE A DAY    montelukast (SINGULAIR) 10 mg tablet TAKE 1 TABLET BY MOUTH EVERY DAY    atorvastatin (LIPITOR) 10 mg tablet TAKE 1 TABLET BY MOUTH EVERY DAY    losartan (COZAAR) 50 mg tablet Take 1 Tablet by mouth daily. Daily for  BP > 140/72    Eliquis 5 mg tablet TAKE 1 TABLET BY MOUTH TWICE A DAY    dilTIAZem ER (CARDIZEM CD) 180 mg capsule TAKE 1 CAPSULE BY MOUTH EVERY DAY    ergocalciferol, vitamin d2, 400 unit cap Take  by mouth daily. coenzyme q10 10 mg cap Take  by mouth daily.       doxycycline (VIBRA-TABS) 100 mg tablet Take 100 mg by mouth two (2) times a day. (Patient not taking: Reported on 8/16/2022)    guaiFENesin-dextromethorphan SR (HUMIBID DM) 600-30 mg per tablet Take 1 Tab by mouth as needed. (Patient not taking: Reported on 8/16/2022)     No current facility-administered medications for this visit. Review of Symptoms:  11 systems reviewed, negative other than as stated in the HPI    Physical ExamPhysical Exam:    Vitals:    08/16/22 1435 08/16/22 1538   BP: (!) 142/80 134/76   Pulse: 72    Resp: 17    SpO2: 99%    Weight: 162 lb (73.5 kg)    Height: 5' 11\" (1.803 m)      Body mass index is 22.59 kg/m². General PE  Gen:  NAD  Mental Status - Alert. General Appearance - Not in acute distress. HEENT:  PERRL, no carotid bruits or JVD  Chest and Lung Exam   Inspection: Accessory muscles - No use of accessory muscles in breathing. Auscultation:   Breath sounds: - Normal.   Cardiovascular   Inspection: Jugular vein - Bilateral - Inspection Normal.   Palpation/Percussion:   Apical Impulse: - Normal.   Auscultation: Rhythm - Regular. Heart Sounds - S1 WNL and S2 WNL. No S3 or S4. Murmurs & Other Heart Sounds: Auscultation of the heart reveals - No Murmurs. Peripheral Vascular   Upper Extremity: Inspection - Bilateral - No Cyanotic nailbeds or Digital clubbing. Lower Extremity:   Palpation: Edema - Bilateral - No edema. Abdomen:   Soft, non-tender, bowel sounds are active.   Neuro: A&O times 3, CN and motor grossly WNL    Labs:   Lab Results   Component Value Date/Time    Cholesterol, total 155 02/10/2021 09:00 AM    Cholesterol, total 135 12/28/2018 03:47 PM    Cholesterol, total 158 11/29/2012 09:03 AM    HDL Cholesterol 60 02/10/2021 09:00 AM    HDL Cholesterol 62 12/28/2018 03:47 PM    HDL Cholesterol 71 11/29/2012 09:03 AM    LDL, calculated 85 02/10/2021 09:00 AM    LDL, calculated 53 12/28/2018 03:47 PM    LDL, calculated 77 11/29/2012 09:03 AM    Triglyceride 44 02/10/2021 09:00 AM    Triglyceride 98 12/28/2018 03:47 PM    Triglyceride 52 11/29/2012 09:03 AM     Lab Results   Component Value Date/Time     01/09/2012 07:50 AM     Lab Results   Component Value Date/Time    Sodium 136 02/10/2021 09:00 AM    Potassium 4.5 02/10/2021 09:00 AM    Chloride 98 02/10/2021 09:00 AM    CO2 23 02/10/2021 09:00 AM    Anion gap 7 12/05/2016 08:31 AM    Glucose 87 02/10/2021 09:00 AM    BUN 20 02/10/2021 09:00 AM    Creatinine 0.82 02/10/2021 09:00 AM    BUN/Creatinine ratio 24 02/10/2021 09:00 AM    GFR est  02/10/2021 09:00 AM    GFR est non-AA 87 02/10/2021 09:00 AM    Calcium 9.7 02/10/2021 09:00 AM    Bilirubin, total 0.4 02/10/2021 09:00 AM    Alk. phosphatase 49 02/10/2021 09:00 AM    Protein, total 6.4 02/10/2021 09:00 AM    Albumin 4.6 02/10/2021 09:00 AM    Globulin 3.3 01/09/2012 07:50 AM    A-G Ratio 2.6 (H) 02/10/2021 09:00 AM    ALT (SGPT) 28 02/10/2021 09:00 AM       EKG:  NSR     07/02/20    ECHO ADULT COMPLETE 07/06/2020 7/6/2020    Interpretation Summary  · Normal cavity size, wall thickness and systolic function (ejection fraction normal). Estimated left ventricular ejection fraction is 60 - 65%. No regional wall motion abnormality noted. Moderate (grade 2) left ventricular diastolic dysfunction. · Mild aortic valve leaflet calcification present with reduced excursion. Mild aortic valve stenosis is present. · Mild mitral valve regurgitation is present. · Pulmonary arterial systolic pressure is 23 mmHg. Signed by: Mino Uriarte MD on 7/6/2020  6:22 PM           Assessment:          ICD-10-CM ICD-9-CM    1. PAF (paroxysmal atrial fibrillation) ([facility])  I48.0 427.31 AMB POC EKG ROUTINE W/ 12 LEADS, INTER & REP      2. Aortic stenosis, mild  I35.0 424.1       3. Mixed hyperlipidemia  E78.2 272.2       4. Essential hypertension  I10 401.9       5.  Nonrheumatic mitral valve regurgitation  I34.0 424.0           Orders Placed This Encounter    AMB POC EKG ROUTINE W/ 12 LEADS, INTER & REP Order Specific Question:   Reason for Exam:     Answer:   routine    betamethasone dipropionate (DIPROSONE) 0.05 % topical cream     Sig: APPLY CREAM TO ECZEMA ON HANDS AND LEGS TOPICALLY TWICE A DAY AS NEEDED    doxycycline (VIBRA-TABS) 100 mg tablet     Sig: Take 100 mg by mouth two (2) times a day. Plan:     PAF (paroxysmal atrial fibrillation) ([address]  Echo done 7/2020 with mild AS, preserved LVEF 60-65% with grade 2 DD, mild MR  Maintaining sinus rhythm  Continue flecainide 100 mg BID  Continue Diltiazem 180 mg daily  Continue Eliquis 5 mg BID   Discussed interaction between fluoroquinolones (was prescribed once) and flecainide, and he will avoid fluoroquinolones. Aortic stenosis, mild  Echo done 7/2020 with mild AS, preserved LVEF 60-65% with grade 2 DD, mild MR  Continue present management  Repeat echo in 7/2022     Mixed hyperlipidemia  On Atorva 10 mg daily  Lab Results   Component Value Date/Time    Cholesterol, total 155 02/10/2021 09:00 AM    Cholesterol (POC) 136 08/05/2019 09:43 AM    HDL Cholesterol 60 02/10/2021 09:00 AM    HDL Cholesterol (POC) 64 08/05/2019 09:43 AM    LDL Cholesterol (POC) n/a 08/05/2019 09:43 AM    LDL, calculated 85 02/10/2021 09:00 AM    LDL, calculated 53 12/28/2018 03:47 PM    VLDL, calculated 10 02/10/2021 09:00 AM    VLDL, calculated 20 12/28/2018 03:47 PM    Triglyceride 44 02/10/2021 09:00 AM    Triglycerides (POC) <45 08/05/2019 09:43 AM      Continue statin therapy and low fat, low cholesterol diet  Lipids managed by PCP     Hypertension:  Controlled  The patient will monitor BP at home and call if generally >140/85. History of normal coronary arteries    Discussed sleep, using melatonin 10 mg and Advil PM x2 currently. Also discussed excessive phlegm production.   Offered sleep medicine consultation, but he will discuss further options with PCP first.       Counseled on diet and exercise- eventual goal of 30-60 minutes 5-7 times a week as per [facility] - MAGDA guidelines. Doing great with this. Has . Continue current care and f/u in 6 months.        Oracio Castro MD

## 2022-08-16 NOTE — LETTER
8/18/2022    Patient: Liseth Garcia   YOB: 1945   Date of Visit: 8/16/2022     Lyn Merino MD  [address]    Dear Lyn Merino MD,      Thank you for referring Mr. Amanda Galeana to [address] for evaluation. My notes for this consultation are attached. If you have questions, please do not hesitate to call me. I look forward to following your patient along with you.       Sincerely,    Eleni Bal MD

## 2022-08-17 RX ORDER — LOSARTAN POTASSIUM 50 MG/1
TABLET ORAL
Qty: 90 TABLET | Refills: 1 | Status: SHIPPED | OUTPATIENT
Start: 2022-08-17

## 2022-08-18 LAB
ECHO AO ASC DIAM: 3.5 CM
ECHO AO ASCENDING AORTA INDEX: 1.81 CM/M2
ECHO AO ROOT DIAM: 3.5 CM
ECHO AO ROOT INDEX: 1.81 CM/M2
ECHO AV AREA PEAK VELOCITY: 1.2 CM2
ECHO AV AREA VTI: 1.3 CM2
ECHO AV AREA/BSA PEAK VELOCITY: 0.6 CM2/M2
ECHO AV AREA/BSA VTI: 0.7 CM2/M2
ECHO AV MEAN GRADIENT: 16 MMHG
ECHO AV MEAN VELOCITY: 1.8 M/S
ECHO AV PEAK GRADIENT: 33 MMHG
ECHO AV PEAK VELOCITY: 2.9 M/S
ECHO AV VELOCITY RATIO: 0.34
ECHO AV VTI: 60.8 CM
ECHO EST RA PRESSURE: 3 MMHG
ECHO LA DIAMETER INDEX: 2.23 CM/M2
ECHO LA DIAMETER: 4.3 CM
ECHO LA TO AORTIC ROOT RATIO: 1.23
ECHO LA VOL 4C: 54 ML (ref 18–58)
ECHO LA VOLUME INDEX A4C: 28 ML/M2 (ref 16–34)
ECHO LV E' LATERAL VELOCITY: 8 CM/S
ECHO LV E' SEPTAL VELOCITY: 6 CM/S
ECHO LV FRACTIONAL SHORTENING: 33 % (ref 28–44)
ECHO LV INTERNAL DIMENSION DIASTOLE INDEX: 2.38 CM/M2
ECHO LV INTERNAL DIMENSION DIASTOLIC: 4.6 CM (ref 4.2–5.9)
ECHO LV INTERNAL DIMENSION SYSTOLIC INDEX: 1.61 CM/M2
ECHO LV INTERNAL DIMENSION SYSTOLIC: 3.1 CM
ECHO LV IVSD: 1.2 CM (ref 0.6–1)
ECHO LV MASS 2D: 205 G (ref 88–224)
ECHO LV MASS INDEX 2D: 106.2 G/M2 (ref 49–115)
ECHO LV POSTERIOR WALL DIASTOLIC: 1.2 CM (ref 0.6–1)
ECHO LV RELATIVE WALL THICKNESS RATIO: 0.52
ECHO LVOT AREA: 3.5 CM2
ECHO LVOT AV VTI INDEX: 0.38
ECHO LVOT DIAM: 2.1 CM
ECHO LVOT MEAN GRADIENT: 2 MMHG
ECHO LVOT PEAK GRADIENT: 4 MMHG
ECHO LVOT PEAK VELOCITY: 1 M/S
ECHO LVOT STROKE VOLUME INDEX: 41.4 ML/M2
ECHO LVOT SV: 80 ML
ECHO LVOT VTI: 23.1 CM
ECHO MV A VELOCITY: 1.06 M/S
ECHO MV AREA PHT: 2.5 CM2
ECHO MV AREA VTI: 1.7 CM2
ECHO MV E DECELERATION TIME (DT): 302.2 MS
ECHO MV E VELOCITY: 1.22 M/S
ECHO MV E/A RATIO: 1.15
ECHO MV E/E' LATERAL: 15.25
ECHO MV E/E' RATIO (AVERAGED): 17.79
ECHO MV E/E' SEPTAL: 20.33
ECHO MV LVOT VTI INDEX: 2.05
ECHO MV MAX VELOCITY: 1.3 M/S
ECHO MV MEAN GRADIENT: 2 MMHG
ECHO MV MEAN VELOCITY: 0.7 M/S
ECHO MV PEAK GRADIENT: 7 MMHG
ECHO MV PRESSURE HALF TIME (PHT): 87.6 MS
ECHO MV VTI: 47.4 CM
ECHO RIGHT VENTRICULAR SYSTOLIC PRESSURE (RVSP): 27 MMHG
ECHO RV INTERNAL DIMENSION: 4.5 CM
ECHO RV TAPSE: 2 CM (ref 1.7–?)
ECHO TV REGURGITANT MAX VELOCITY: 2.47 M/S
ECHO TV REGURGITANT PEAK GRADIENT: 24 MMHG

## 2022-08-18 PROCEDURE — 93306 TTE W/DOPPLER COMPLETE: CPT | Performed by: INTERNAL MEDICINE

## 2022-11-10 PROBLEM — D68.69 SECONDARY HYPERCOAGULABLE STATE (HCC): Status: ACTIVE | Noted: 2022-11-10

## 2022-11-10 PROBLEM — D69.2 SENILE PURPURA (HCC): Status: ACTIVE | Noted: 2022-11-10

## 2023-02-21 ENCOUNTER — OFFICE VISIT (OUTPATIENT)
Dept: CARDIOLOGY CLINIC | Age: 78
End: 2023-02-21
Payer: MEDICARE

## 2023-02-21 VITALS
HEART RATE: 75 BPM | SYSTOLIC BLOOD PRESSURE: 129 MMHG | OXYGEN SATURATION: 99 % | HEIGHT: 71 IN | RESPIRATION RATE: 16 BRPM | WEIGHT: 169 LBS | BODY MASS INDEX: 23.66 KG/M2 | DIASTOLIC BLOOD PRESSURE: 76 MMHG

## 2023-02-21 DIAGNOSIS — I48.0 PAF (PAROXYSMAL ATRIAL FIBRILLATION) (HCC): Primary | ICD-10-CM

## 2023-02-21 DIAGNOSIS — I10 ESSENTIAL HYPERTENSION: ICD-10-CM

## 2023-02-21 DIAGNOSIS — I35.0 AORTIC STENOSIS, MILD: ICD-10-CM

## 2023-02-21 DIAGNOSIS — I34.0 NONRHEUMATIC MITRAL VALVE REGURGITATION: ICD-10-CM

## 2023-02-21 DIAGNOSIS — E78.2 MIXED HYPERLIPIDEMIA: ICD-10-CM

## 2023-02-21 PROCEDURE — G8536 NO DOC ELDER MAL SCRN: HCPCS | Performed by: INTERNAL MEDICINE

## 2023-02-21 PROCEDURE — 3078F DIAST BP <80 MM HG: CPT | Performed by: INTERNAL MEDICINE

## 2023-02-21 PROCEDURE — G8420 CALC BMI NORM PARAMETERS: HCPCS | Performed by: INTERNAL MEDICINE

## 2023-02-21 PROCEDURE — G0463 HOSPITAL OUTPT CLINIC VISIT: HCPCS | Performed by: INTERNAL MEDICINE

## 2023-02-21 PROCEDURE — G8432 DEP SCR NOT DOC, RNG: HCPCS | Performed by: INTERNAL MEDICINE

## 2023-02-21 PROCEDURE — 1101F PT FALLS ASSESS-DOCD LE1/YR: CPT | Performed by: INTERNAL MEDICINE

## 2023-02-21 PROCEDURE — 93010 ELECTROCARDIOGRAM REPORT: CPT | Performed by: INTERNAL MEDICINE

## 2023-02-21 PROCEDURE — 3074F SYST BP LT 130 MM HG: CPT | Performed by: INTERNAL MEDICINE

## 2023-02-21 PROCEDURE — 99214 OFFICE O/P EST MOD 30 MIN: CPT | Performed by: INTERNAL MEDICINE

## 2023-02-21 PROCEDURE — 1123F ACP DISCUSS/DSCN MKR DOCD: CPT | Performed by: INTERNAL MEDICINE

## 2023-02-21 PROCEDURE — 93005 ELECTROCARDIOGRAM TRACING: CPT | Performed by: INTERNAL MEDICINE

## 2023-02-21 PROCEDURE — G8427 DOCREV CUR MEDS BY ELIG CLIN: HCPCS | Performed by: INTERNAL MEDICINE

## 2023-02-21 RX ORDER — LOSARTAN POTASSIUM 25 MG/1
25 TABLET ORAL DAILY
Qty: 90 TABLET | Refills: 4 | Status: SHIPPED | OUTPATIENT
Start: 2023-02-21

## 2023-02-21 NOTE — LETTER
2/21/2023    Patient: Franklyn Saucedo   YOB: 1945   Date of Visit: 2/21/2023     Carlos Gaytan MD  [address]    Dear Carlos Gaytan MD,      Thank you for referring Mr. Christelle Raines to [address] [place] [address] for evaluation. My notes for this consultation are attached. If you have questions, please do not hesitate to call me. I look forward to following your patient along with you.       Sincerely,    Dusty Sweet MD

## 2023-02-21 NOTE — PROGRESS NOTES
[facility] [address], [address] [place] [address]  [phone]    [address]      Subjective:      Raoul John is a 68 y.o. male is here for routine f/u. The patient was last seen by us in August 2022. Today, the patient denies chest pain/ shortness of breath, orthopnea, PND, LE edema, palpitations, syncope, or presyncope.        Patient Active Problem List    Diagnosis Date Noted    Senile purpura ([address] 11/10/2022    Secondary hypercoagulable state ([address] 11/10/2022    BCC (basal cell carcinoma of skin)     Irregular heart beat 09/21/2016    PAF (paroxysmal atrial fibrillation) ([address] 02/25/2016    Aortic stenosis, mild 02/25/2016    Mitral regurgitation 07/06/2015    Colon polyps 03/13/2015    Mixed hyperlipidemia 05/17/2012    Atrial flutter ([address] 03/06/2012    Normal coronary arteries 02/02/2012    Atrial fibrillation with RVR ([address] 01/09/2012    Hallux valgus (acquired) 01/05/2012    Anemia 07/20/2011    Hyperlipidemia 07/19/2011    BPH (benign prostatic hyperplasia) 07/19/2011      Sara Bauman MD  Past Medical History:   Diagnosis Date    Anemia 7/20/2011    Arrhythmia     A-flutter    Atrial fibrillation (HCC)     Atrial flutter (HCC)     BCC (basal cell carcinoma of skin)     BPH (benign prostatic hyperplasia) 7/19/2011    Colon polyps 3/13/2015    Tubular adenomas    Essential hypertension     Hyperlipidemia 7/19/2011    Long term current use of anticoagulant therapy     Murmur     Other acute and subacute form of ischemic heart disease     Other ill-defined conditions(799.89) 1996    fractured ribs     Palpitations     Valvular heart disease       Past Surgical History:   Procedure Laterality Date    CARDIAC CATHETERIZATION  2/2/2012         HX HEART CATHETERIZATION      HX HERNIA REPAIR  12/07/2016    RIHR WITH MESH    HX ORTHOPAEDIC  2011    left bunionectomy and hammertoe repair    HX OTHER SURGICAL      cardiac ablation 3/7/12    HX TONSILLECTOMY [place] UNLISTED PROCEDURE CARDIAC SURGERY      cardiac cath     No Known Allergies   Family History   Problem Relation Age of Onset    Heart Disease Mother     Cancer Sister     Cancer Sister       Social History     Socioeconomic History    Marital status:      Spouse name: Not on file    Number of children: Not on file    Years of education: Not on file    Highest education level: Not on file   Occupational History    Not on file   Tobacco Use    Smoking status: Former     Packs/day: 1.00     Years: 15.00     Pack years: 15.00     Types: Cigarettes     Quit date: 1985     Years since quittin.0    Smokeless tobacco: Never   Vaping Use    Vaping Use: Never used   Substance and Sexual Activity    Alcohol use: Yes     Alcohol/week: 11.7 standard drinks     Types: 14 Glasses of wine per week     Comment: 2 glasses of wine nightly with dinner    Drug use: No    Sexual activity: Not on file   Other Topics Concern    Not on file   Social History Narrative    Not on file     Social Determinants of Health     Financial Resource Strain: Not on file   Food Insecurity: Not on file   Transportation Needs: Not on file   Physical Activity: Not on file   Stress: Not on file   Social Connections: Not on file   Intimate Partner Violence: Not on file   Housing Stability: Not on file      Current Outpatient Medications   Medication Sig    losartan (COZAAR) 25 mg tablet Take 1 Tablet by mouth daily. dilTIAZem ER (CARDIZEM CD) 180 mg capsule TAKE 1 CAPSULE BY MOUTH EVERY DAY    Eliquis 5 mg tablet TAKE 1 TABLET BY MOUTH TWICE A DAY    albuterol (PROVENTIL HFA, VENTOLIN HFA, PROAIR HFA) 90 mcg/actuation inhaler Take 1 Puff by inhalation every six (6) hours as needed for Wheezing.     betamethasone dipropionate (DIPROSONE) 0.05 % topical cream APPLY CREAM TO ECZEMA ON HANDS AND LEGS TOPICALLY TWICE A DAY AS NEEDED    flecainide (TAMBOCOR) 100 mg tablet TAKE 1 TABLET BY MOUTH TWICE A DAY    montelukast (SINGULAIR) 10 mg tablet TAKE 1 TABLET BY MOUTH EVERY DAY    atorvastatin (LIPITOR) 10 mg tablet TAKE 1 TABLET BY MOUTH EVERY DAY    ergocalciferol, vitamin d2, 400 unit cap Take  by mouth daily. coenzyme q10 10 mg cap Take  by mouth daily. doxycycline (VIBRA-TABS) 100 mg tablet Take 100 mg by mouth two (2) times a day. (Patient not taking: No sig reported)    guaiFENesin-dextromethorphan SR (HUMIBID DM) 600-30 mg per tablet Take 1 Tab by mouth as needed. (Patient not taking: No sig reported)     No current facility-administered medications for this visit. Review of Symptoms:  11 systems reviewed, negative other than as stated in the HPI    Physical ExamPhysical Exam:    Vitals:    02/21/23 0921 02/21/23 0933 02/21/23 1015   BP: (!) 150/70 (!) 144/70 129/76   Pulse: 75     Resp: 16     SpO2: 99%     Weight: 169 lb (76.7 kg)     Height: 5' 11\" (1.803 m)       Body mass index is 23.57 kg/m². General PE  Gen:  NAD  Mental Status - Alert. General Appearance - Not in acute distress. HEENT:  PERRL, no carotid bruits or JVD  Chest and Lung Exam   Inspection: Accessory muscles - No use of accessory muscles in breathing. Auscultation:   Breath sounds: - Normal.   Cardiovascular   Inspection: Jugular vein - Bilateral - Inspection Normal.   Palpation/Percussion:   Apical Impulse: - Normal.   Auscultation: Rhythm - Regular. Heart Sounds - S1 WNL and S2 WNL. No S3 or S4. Murmurs & Other Heart Sounds: Auscultation of the heart reveals - No Murmurs. Peripheral Vascular   Upper Extremity: Inspection - Bilateral - No Cyanotic nailbeds or Digital clubbing. Lower Extremity:   Palpation: Edema - Bilateral - No edema. Abdomen:   Soft, non-tender, bowel sounds are active.   Neuro: A&O times 3, CN and motor grossly WNL    Labs:   Lab Results   Component Value Date/Time    Cholesterol, total 148 11/10/2022 09:07 AM    Cholesterol, total 155 02/10/2021 09:00 AM    Cholesterol, total 135 12/28/2018 03:47 PM    Cholesterol, total 158 11/29/2012 09:03 AM    HDL Cholesterol 60 11/10/2022 09:07 AM    HDL Cholesterol 60 02/10/2021 09:00 AM    HDL Cholesterol 62 12/28/2018 03:47 PM    HDL Cholesterol 71 11/29/2012 09:03 AM    LDL, calculated 76 11/10/2022 09:07 AM    LDL, calculated 85 02/10/2021 09:00 AM    LDL, calculated 53 12/28/2018 03:47 PM    LDL, calculated 77 11/29/2012 09:03 AM    Triglyceride 60 11/10/2022 09:07 AM    Triglyceride 44 02/10/2021 09:00 AM    Triglyceride 98 12/28/2018 03:47 PM    Triglyceride 52 11/29/2012 09:03 AM     Lab Results   Component Value Date/Time     01/09/2012 07:50 AM     Lab Results   Component Value Date/Time    Sodium 134 11/10/2022 09:07 AM    Potassium 4.3 11/10/2022 09:07 AM    Chloride 95 (L) 11/10/2022 09:07 AM    CO2 26 11/10/2022 09:07 AM    Anion gap 7 12/05/2016 08:31 AM    Glucose 86 11/10/2022 09:07 AM    BUN 17 11/10/2022 09:07 AM    Creatinine 0.96 11/10/2022 09:07 AM    BUN/Creatinine ratio 18 11/10/2022 09:07 AM    GFR est  02/10/2021 09:00 AM    GFR est non-AA 87 02/10/2021 09:00 AM    Calcium 9.6 11/10/2022 09:07 AM    Bilirubin, total 0.5 11/10/2022 09:07 AM    Alk. phosphatase 52 11/10/2022 09:07 AM    Protein, total 6.4 11/10/2022 09:07 AM    Albumin 4.5 11/10/2022 09:07 AM    Globulin 3.3 01/09/2012 07:50 AM    A-G Ratio 2.4 (H) 11/10/2022 09:07 AM    ALT (SGPT) 22 11/10/2022 09:07 AM       EKG:  NSR    08/16/22    ECHO ADULT COMPLETE 08/18/2022 8/18/2022    Interpretation Summary    Left Ventricle: Normal left ventricular systolic function with a visually estimated EF of 55 - 60%. Left ventricle size is normal. Mildly increased wall thickness. Normal wall motion. Normal diastolic function. Left Atrium: Left atrium is mildly dilated. Volume index is 35 mL/m2. Aortic Valve: Mild stenosis of the aortic valve. AV mean gradient is 16 mmHg. Mitral Valve: Mild annular calcification of the mitral valve. Mild regurgitation. No significant stenosis noted.  MV mean gradient is 2 mmHg. Signed by: Kosta Snyder MD on 8/18/2022  2:41 PM           Assessment:          ICD-10-CM ICD-9-CM    1. PAF (paroxysmal atrial fibrillation) ([facility])  I48.0 427.31 AMB POC EKG ROUTINE W/ 12 LEADS, INTER & REP      2. Essential hypertension  I10 401.9       3. Mixed hyperlipidemia  E78.2 272.2       4. Nonrheumatic mitral valve regurgitation  I34.0 424.0       5. Aortic stenosis, mild  I35.0 424.1           Orders Placed This Encounter    AMB POC EKG ROUTINE W/ 12 LEADS, INTER & REP     Order Specific Question:   Reason for Exam:     Answer:   routine    losartan (COZAAR) 25 mg tablet     Sig: Take 1 Tablet by mouth daily. Dispense:  90 Tablet     Refill:  4        Plan:     PAF (paroxysmal atrial fibrillation) ([address]  Echo August 2022 showed LVEF 55-60, mild mitral stenosis, mild mitral regurgitation  Echo done 7/2020 with mild AS, preserved LVEF 60-65% with grade 2 DD, mild MR  Maintaining sinus rhythm  Continue flecainide 100 mg BID  Continue Diltiazem 180 mg daily  Continue Eliquis 5 mg BID. Discussed interaction between fluoroquinolones (was prescribed once) and flecainide, and he will avoid fluoroquinolones.      Aortic stenosis, mild  Echo August 2022 showed LVEF 55-60, mild mitral stenosis, mild mitral regurgitation  Echo done 7/2020 with mild AS, preserved LVEF 60-65% with grade 2 DD, mild MR  Continue present management  We will plan to repeat echo in approximately August 2025 if no change in murmur or symptoms       Mixed hyperlipidemia  On Atorva 10 mg daily  Lab Results   Component Value Date/Time    Cholesterol, total 148 11/10/2022 09:07 AM    Cholesterol (POC) 136 08/05/2019 09:43 AM    HDL Cholesterol 60 11/10/2022 09:07 AM    HDL Cholesterol (POC) 64 08/05/2019 09:43 AM    LDL Cholesterol (POC) n/a 08/05/2019 09:43 AM    LDL, calculated 76 11/10/2022 09:07 AM    LDL, calculated 53 12/28/2018 03:47 PM    VLDL, calculated 12 11/10/2022 09:07 AM    VLDL, calculated 20 12/28/2018 03:47 PM    Triglyceride 60 11/10/2022 09:07 AM    Triglycerides (POC) <45 08/05/2019 09:43 AM    Continue statin therapy and low fat, low cholesterol diet  Lipids managed by PCP     Hypertension:  Controlled at home 128/66  The patient will monitor BP at home and call if generally >140/85. History of normal coronary arteries     Previously discussed sleep, using melatonin 10 mg and Advil PM x2 currently. Also discussed excessive phlegm production. Offered sleep medicine consultation, but he will discuss further options with PCP first.       Counseled on diet and exercise- eventual goal of 30-60 minutes 5-7 times a week as per AHA guidelines. Doing great with this. Has . Continue current care and f/u in 6 months.         Blanca Keene MD

## 2023-03-21 RX ORDER — APIXABAN 5 MG/1
TABLET, FILM COATED ORAL
Qty: 180 TABLET | Refills: 1 | Status: SHIPPED | OUTPATIENT
Start: 2023-03-21

## 2023-03-21 NOTE — TELEPHONE ENCOUNTER
Refill Request Received for the Following Medication     Requested Prescriptions     Pending Prescriptions Disp Refills    Eliquis 5 mg tablet [Pharmacy Med Name: ELIQUIS 5 MG TABLET] 180 Tablet 0     Sig: TAKE 1 TABLET BY MOUTH TWICE A DAY       Last Prescribed:    Last Appointment With Me:  2/21/2023     Future Appointments:  Future Appointments   Date Time Provider Karissa Dalton   8/28/2023  1:40 PM Ernie Meigs, MD [facility]

## 2023-04-19 RX ORDER — DILTIAZEM HYDROCHLORIDE 180 MG/1
CAPSULE, COATED, EXTENDED RELEASE ORAL
Qty: 90 CAPSULE | Refills: 1 | Status: SHIPPED | OUTPATIENT
Start: 2023-04-19

## 2023-04-19 NOTE — TELEPHONE ENCOUNTER
Refill Request Received for the Following Medication     Requested Prescriptions     Pending Prescriptions Disp Refills    dilTIAZem ER (CARDIZEM CD) 180 mg capsule [Pharmacy Med Name: DILTIAZEM 24H ER(CD) 180 MG CP] 90 Capsule 1     Sig: TAKE 1 CAPSULE BY MOUTH EVERY DAY       Last Prescribed:01-    Last Appointment With Me:  2/21/2023     Future Appointments:  Future Appointments   Date Time Provider Karissa Dalton   8/28/2023  1:40 PM MD TRAM Conte AMB

## 2023-08-06 DIAGNOSIS — I48.0 PAROXYSMAL ATRIAL FIBRILLATION (HCC): Primary | ICD-10-CM

## 2023-08-06 DIAGNOSIS — E78.2 MIXED HYPERLIPIDEMIA: ICD-10-CM

## 2023-08-06 DIAGNOSIS — I34.0 NONRHEUMATIC MITRAL (VALVE) INSUFFICIENCY: ICD-10-CM

## 2023-08-07 RX ORDER — FLECAINIDE ACETATE 100 MG/1
TABLET ORAL
Qty: 180 TABLET | Refills: 0 | Status: SHIPPED | OUTPATIENT
Start: 2023-08-07

## 2023-08-07 NOTE — TELEPHONE ENCOUNTER
Refill Request Received for the Following Medication     Requested Prescriptions     Pending Prescriptions Disp Refills    flecainide (TAMBOCOR) 100 MG tablet [Pharmacy Med Name: FLECAINIDE ACETATE 100 MG TAB] 180 tablet 3     Sig: TAKE 1 TABLET BY MOUTH TWICE A DAY       Last Prescribed:07-    Last Appointment With Me:  2/21/2023     Future Appointments:  Future Appointments   Date Time Provider [address] [place] [address]   8/28/2023  1:40 PM Marcelene Soulier, MD [facility]    Patient needs blood work for further refills.

## 2023-08-18 DIAGNOSIS — I34.0 NONRHEUMATIC MITRAL (VALVE) INSUFFICIENCY: ICD-10-CM

## 2023-08-18 DIAGNOSIS — I48.0 PAROXYSMAL ATRIAL FIBRILLATION (HCC): ICD-10-CM

## 2023-08-19 LAB
ALBUMIN SERPL-MCNC: 3.9 G/DL (ref 3.5–5)
ALBUMIN/GLOB SERPL: 1.5 (ref 1.1–2.2)
ALP SERPL-CCNC: 57 U/L (ref 45–117)
ALT SERPL-CCNC: 36 U/L (ref 12–78)
ANION GAP SERPL CALC-SCNC: 5 MMOL/L (ref 5–15)
AST SERPL-CCNC: 32 U/L (ref 15–37)
BILIRUB SERPL-MCNC: 0.4 MG/DL (ref 0.2–1)
BUN SERPL-MCNC: 22 MG/DL (ref 6–20)
BUN/CREAT SERPL: 20 (ref 12–20)
CALCIUM SERPL-MCNC: 9.2 MG/DL (ref 8.5–10.1)
CHLORIDE SERPL-SCNC: 97 MMOL/L (ref 97–108)
CO2 SERPL-SCNC: 28 MMOL/L (ref 21–32)
CREAT SERPL-MCNC: 1.1 MG/DL (ref 0.7–1.3)
GLOBULIN SER CALC-MCNC: 2.6 G/DL (ref 2–4)
GLUCOSE SERPL-MCNC: 92 MG/DL (ref 65–100)
POTASSIUM SERPL-SCNC: 4.5 MMOL/L (ref 3.5–5.1)
PROT SERPL-MCNC: 6.5 G/DL (ref 6.4–8.2)
SODIUM SERPL-SCNC: 130 MMOL/L (ref 136–145)

## 2023-08-28 ENCOUNTER — OFFICE VISIT (OUTPATIENT)
Age: 78
End: 2023-08-28
Payer: MEDICARE

## 2023-08-28 VITALS
DIASTOLIC BLOOD PRESSURE: 62 MMHG | OXYGEN SATURATION: 99 % | BODY MASS INDEX: 22.68 KG/M2 | HEART RATE: 70 BPM | WEIGHT: 162 LBS | HEIGHT: 71 IN | SYSTOLIC BLOOD PRESSURE: 132 MMHG | RESPIRATION RATE: 16 BRPM

## 2023-08-28 DIAGNOSIS — I35.0 AORTIC STENOSIS, MILD: ICD-10-CM

## 2023-08-28 DIAGNOSIS — I10 ESSENTIAL (PRIMARY) HYPERTENSION: ICD-10-CM

## 2023-08-28 DIAGNOSIS — I48.0 PAROXYSMAL ATRIAL FIBRILLATION (HCC): Primary | ICD-10-CM

## 2023-08-28 DIAGNOSIS — E78.2 MIXED HYPERLIPIDEMIA: ICD-10-CM

## 2023-08-28 DIAGNOSIS — I34.0 NONRHEUMATIC MITRAL (VALVE) INSUFFICIENCY: ICD-10-CM

## 2023-08-28 PROCEDURE — G8420 CALC BMI NORM PARAMETERS: HCPCS | Performed by: INTERNAL MEDICINE

## 2023-08-28 PROCEDURE — 93005 ELECTROCARDIOGRAM TRACING: CPT | Performed by: INTERNAL MEDICINE

## 2023-08-28 PROCEDURE — 99214 OFFICE O/P EST MOD 30 MIN: CPT | Performed by: INTERNAL MEDICINE

## 2023-08-28 PROCEDURE — 1036F TOBACCO NON-USER: CPT | Performed by: INTERNAL MEDICINE

## 2023-08-28 PROCEDURE — 3078F DIAST BP <80 MM HG: CPT | Performed by: INTERNAL MEDICINE

## 2023-08-28 PROCEDURE — 3075F SYST BP GE 130 - 139MM HG: CPT | Performed by: INTERNAL MEDICINE

## 2023-08-28 PROCEDURE — 93010 ELECTROCARDIOGRAM REPORT: CPT | Performed by: INTERNAL MEDICINE

## 2023-08-28 PROCEDURE — 1123F ACP DISCUSS/DSCN MKR DOCD: CPT | Performed by: INTERNAL MEDICINE

## 2023-08-28 PROCEDURE — G8427 DOCREV CUR MEDS BY ELIG CLIN: HCPCS | Performed by: INTERNAL MEDICINE

## 2023-08-28 RX ORDER — CIPROFLOXACIN 500 MG/1
500 TABLET, FILM COATED ORAL 2 TIMES DAILY PRN
COMMUNITY
Start: 2021-01-14

## 2023-08-28 ASSESSMENT — PATIENT HEALTH QUESTIONNAIRE - PHQ9
SUM OF ALL RESPONSES TO PHQ QUESTIONS 1-9: 0
2. FEELING DOWN, DEPRESSED OR HOPELESS: 0
SUM OF ALL RESPONSES TO PHQ QUESTIONS 1-9: 0
SUM OF ALL RESPONSES TO PHQ QUESTIONS 1-9: 0
1. LITTLE INTEREST OR PLEASURE IN DOING THINGS: 0
SUM OF ALL RESPONSES TO PHQ9 QUESTIONS 1 & 2: 0
SUM OF ALL RESPONSES TO PHQ QUESTIONS 1-9: 0

## 2023-08-28 NOTE — PROGRESS NOTES
[address] [place] [address] [address]  [address] [facility] [place], [address] [place] [address] [place]     Subjective:        Maryjane Stanley is a 68 y.o. male is here for routine f/u. Last seen by me in February 2023. The patient denies chest pain/ shortness of breath, orthopnea, PND, LE edema, palpitations, syncope, presyncope or fatigue.     Patient Active Problem List    Diagnosis Date Noted    Senile purpura ([address]) 11/10/2022    Secondary hypercoagulable state ([address]) 11/10/2022    BCC (basal cell carcinoma of skin)     Irregular heart beat 09/21/2016    Aortic stenosis, mild 02/25/2016    PAF (paroxysmal atrial fibrillation) ([address]) 02/25/2016    Mitral regurgitation 07/06/2015    Colon polyps 03/13/2015    Mixed hyperlipidemia 05/17/2012    Atrial flutter ([address]) 03/06/2012    Atrial fibrillation with RVR ([address]) 01/09/2012    Anemia 07/20/2011    Hyperlipidemia 07/19/2011    BPH (benign prostatic hyperplasia) 07/19/2011      Xavier Kruger MD  Past Medical History:   Diagnosis Date    Anemia 7/20/2011    Arrhythmia     A-flutter    Atrial fibrillation (HCC)     Atrial flutter (HCC)     BCC (basal cell carcinoma of skin)     BPH (benign prostatic hyperplasia) 7/19/2011    Colon polyps 3/13/2015    Tubular adenomas    Essential hypertension     Hyperlipidemia 7/19/2011    Long term current use of anticoagulant therapy     Murmur     Other acute and subacute form of ischemic heart disease     Other ill-defined conditions(799.86) 1996    fractured ribs     Palpitations     Valvular heart disease       Past Surgical History:   Procedure Laterality Date    CARDIAC CATH PROCEDURE  2/2/2012         CARDIAC CATHETERIZATION      HERNIA REPAIR  12/07/2016    [facility] WITH MESH    ORTHOPEDIC SURGERY  2011    left bunionectomy and hammertoe repair    OTHER SURGICAL HISTORY      cardiac ablation 3/7/12    [place] UNLISTED PROCEDURE CARDIAC SURGERY      cardiac cath    TONSILLECTOMY       No Known Allergies   Family History

## 2023-09-28 RX ORDER — FLECAINIDE ACETATE 100 MG/1
TABLET ORAL
Qty: 180 TABLET | Refills: 2 | Status: SHIPPED | OUTPATIENT
Start: 2023-09-28

## 2023-09-28 RX ORDER — DILTIAZEM HYDROCHLORIDE 180 MG/1
CAPSULE, COATED, EXTENDED RELEASE ORAL DAILY
Qty: 90 CAPSULE | Refills: 2 | Status: SHIPPED | OUTPATIENT
Start: 2023-09-28

## 2023-10-05 RX ORDER — APIXABAN 5 MG/1
5 TABLET, FILM COATED ORAL 2 TIMES DAILY
Qty: 180 TABLET | Refills: 3 | Status: SHIPPED | OUTPATIENT
Start: 2023-10-05

## 2023-10-05 NOTE — TELEPHONE ENCOUNTER
Refill Request Received for the Following Medication     Requested Prescriptions     Pending Prescriptions Disp Refills    ELIQUIS 5 MG TABS tablet [Pharmacy Med Name: ELIQUIS 5 MG TABLET] 180 tablet 1     Sig: TAKE 1 TABLET BY MOUTH TWICE A DAY       Last Prescribed:03-21-23    Last Appointment With Me:  8/28/2023     Future Appointments:  Future Appointments   Date Time Provider [address]   3/4/2024 11:20 AM Britt Garrido MD [facility]

## 2024-04-02 ENCOUNTER — OFFICE VISIT (OUTPATIENT)
Age: 79
End: 2024-04-02
Payer: MEDICARE

## 2024-04-02 VITALS
BODY MASS INDEX: 23.24 KG/M2 | DIASTOLIC BLOOD PRESSURE: 62 MMHG | HEART RATE: 85 BPM | WEIGHT: 166 LBS | SYSTOLIC BLOOD PRESSURE: 134 MMHG | HEIGHT: 71 IN | OXYGEN SATURATION: 99 %

## 2024-04-02 DIAGNOSIS — I10 ESSENTIAL (PRIMARY) HYPERTENSION: ICD-10-CM

## 2024-04-02 DIAGNOSIS — I34.0 NONRHEUMATIC MITRAL VALVE REGURGITATION: ICD-10-CM

## 2024-04-02 DIAGNOSIS — E78.2 MIXED HYPERLIPIDEMIA: ICD-10-CM

## 2024-04-02 DIAGNOSIS — I35.0 NONRHEUMATIC AORTIC (VALVE) STENOSIS: ICD-10-CM

## 2024-04-02 DIAGNOSIS — I35.0 AORTIC STENOSIS, MILD: ICD-10-CM

## 2024-04-02 DIAGNOSIS — I48.0 PAROXYSMAL ATRIAL FIBRILLATION (HCC): Primary | ICD-10-CM

## 2024-04-02 PROCEDURE — 99214 OFFICE O/P EST MOD 30 MIN: CPT | Performed by: INTERNAL MEDICINE

## 2024-04-02 PROCEDURE — G8420 CALC BMI NORM PARAMETERS: HCPCS | Performed by: INTERNAL MEDICINE

## 2024-04-02 PROCEDURE — 1036F TOBACCO NON-USER: CPT | Performed by: INTERNAL MEDICINE

## 2024-04-02 PROCEDURE — 3075F SYST BP GE 130 - 139MM HG: CPT | Performed by: INTERNAL MEDICINE

## 2024-04-02 PROCEDURE — 3078F DIAST BP <80 MM HG: CPT | Performed by: INTERNAL MEDICINE

## 2024-04-02 PROCEDURE — 93005 ELECTROCARDIOGRAM TRACING: CPT | Performed by: INTERNAL MEDICINE

## 2024-04-02 PROCEDURE — G8427 DOCREV CUR MEDS BY ELIG CLIN: HCPCS | Performed by: INTERNAL MEDICINE

## 2024-04-02 PROCEDURE — 1123F ACP DISCUSS/DSCN MKR DOCD: CPT | Performed by: INTERNAL MEDICINE

## 2024-04-02 PROCEDURE — 93010 ELECTROCARDIOGRAM REPORT: CPT | Performed by: INTERNAL MEDICINE

## 2024-04-02 RX ORDER — FLUTICASONE PROPIONATE 50 MCG
1 SPRAY, SUSPENSION (ML) NASAL DAILY
COMMUNITY

## 2024-04-02 NOTE — PROGRESS NOTES
[address] [place] [address] [phone]    [address] [place] [address]     Subjective:        Ryan Brock is a 78 y.o. male is here for routine f/u.  Patient has unfortunately had some friends die recently, and acknowledges he feels a little bit of anxiety coming to this doctor's appointment.  His blood pressure yesterday at home was 142 systolic which is high for him, and a bit elevated on first check today.  The patient denies chest pain/ shortness of breath, orthopnea, PND, LE edema, palpitations, syncope, presyncope or fatigue.    Patient Active Problem List    Diagnosis Date Noted    Senile purpura (HCC) 11/10/2022    Secondary hypercoagulable state (HCC) 11/10/2022    BCC (basal cell carcinoma of skin)     Irregular heart beat 09/21/2016    Aortic stenosis, mild 02/25/2016    PAF (paroxysmal atrial fibrillation) (HCC) 02/25/2016    Mitral regurgitation 07/06/2015    Colon polyps 03/13/2015    Mixed hyperlipidemia 05/17/2012    Atrial flutter (HCC) 03/06/2012    Atrial fibrillation with RVR (HCC) 01/09/2012    Anemia 07/20/2011    Hyperlipidemia 07/19/2011    BPH (benign prostatic hyperplasia) 07/19/2011      Chetan Larsen MD  Past Medical History:   Diagnosis Date    Anemia 7/20/2011    Arrhythmia     A-flutter    Atrial fibrillation (HCC)     Atrial flutter (HCC)     BCC (basal cell carcinoma of skin)     BPH (benign prostatic hyperplasia) 7/19/2011    Colon polyps 3/13/2015    Tubular adenomas    Essential hypertension     Hyperlipidemia 7/19/2011    Long term current use of anticoagulant therapy     Murmur     Other acute and subacute form of ischemic heart disease     Other ill-defined conditions(799.89) 1996    fractured ribs     Palpitations     Valvular heart disease       Past Surgical History:   Procedure Laterality Date    CARDIAC CATH PROCEDURE  2/2/2012         CARDIAC CATHETERIZATION      HERNIA REPAIR  12/07/2016    RIHR WITH MESH

## 2024-04-09 RX ORDER — LOSARTAN POTASSIUM 25 MG/1
25 TABLET ORAL DAILY
Qty: 90 TABLET | Refills: 3 | Status: SHIPPED | OUTPATIENT
Start: 2024-04-09

## 2024-04-09 NOTE — TELEPHONE ENCOUNTER
Refill Request Received for the Following Medication     Requested Prescriptions     Signed Prescriptions Disp Refills    losartan (COZAAR) 25 MG tablet 90 tablet 3     Sig: Take 1 tablet by mouth daily     Authorizing Provider: PJ THOMAS     Ordering User: MCMAHAN RHONDA       Last Prescribed:02-21-23    Last Appointment With Me:  4/2/2024     Future Appointments:  Future Appointments   Date Time Provider Department Center   4/30/2024  3:45 PM BSROJELIO NGUYEN [facility] [address] [facility] [facility]   10/8/2024  9:20 AM Pj Thomas MD [facility]

## 2024-04-30 ENCOUNTER — ANCILLARY PROCEDURE (OUTPATIENT)
Age: 79
End: 2024-04-30
Payer: MEDICARE

## 2024-04-30 VITALS
HEIGHT: 71 IN | SYSTOLIC BLOOD PRESSURE: 134 MMHG | WEIGHT: 166 LBS | DIASTOLIC BLOOD PRESSURE: 62 MMHG | BODY MASS INDEX: 23.24 KG/M2

## 2024-04-30 DIAGNOSIS — I10 ESSENTIAL (PRIMARY) HYPERTENSION: ICD-10-CM

## 2024-04-30 DIAGNOSIS — I34.0 NONRHEUMATIC MITRAL VALVE REGURGITATION: ICD-10-CM

## 2024-04-30 DIAGNOSIS — I48.0 PAROXYSMAL ATRIAL FIBRILLATION (HCC): ICD-10-CM

## 2024-04-30 DIAGNOSIS — E78.2 MIXED HYPERLIPIDEMIA: ICD-10-CM

## 2024-04-30 DIAGNOSIS — I35.0 NONRHEUMATIC AORTIC (VALVE) STENOSIS: ICD-10-CM

## 2024-04-30 PROCEDURE — 93306 TTE W/DOPPLER COMPLETE: CPT | Performed by: INTERNAL MEDICINE

## 2024-05-02 LAB
ECHO AO ASC DIAM: 3.5 CM
ECHO AO ASCENDING AORTA INDEX: 1.79 CM/M2
ECHO AO ROOT DIAM: 3.5 CM
ECHO AO ROOT INDEX: 1.79 CM/M2
ECHO AV AREA PEAK VELOCITY: 1.5 CM2
ECHO AV AREA VTI: 1.6 CM2
ECHO AV AREA/BSA PEAK VELOCITY: 0.8 CM2/M2
ECHO AV AREA/BSA VTI: 0.8 CM2/M2
ECHO AV MEAN GRADIENT: 20 MMHG
ECHO AV MEAN VELOCITY: 2.1 M/S
ECHO AV PEAK GRADIENT: 39 MMHG
ECHO AV PEAK VELOCITY: 3.1 M/S
ECHO AV VELOCITY RATIO: 0.35
ECHO AV VTI: 73 CM
ECHO BSA: 1.94 M2
ECHO EST RA PRESSURE: 3 MMHG
ECHO LA DIAMETER INDEX: 2.21 CM/M2
ECHO LA DIAMETER: 4.3 CM
ECHO LA TO AORTIC ROOT RATIO: 1.23
ECHO LA VOL A-L A2C: 96 ML (ref 18–58)
ECHO LA VOL A-L A4C: 69 ML (ref 18–58)
ECHO LA VOL BP: 86 ML (ref 18–58)
ECHO LA VOL MOD A2C: 91 ML (ref 18–58)
ECHO LA VOL MOD A4C: 66 ML (ref 18–58)
ECHO LA VOL/BSA BIPLANE: 44 ML/M2 (ref 16–34)
ECHO LA VOLUME AREA LENGTH: 90 ML
ECHO LA VOLUME INDEX A-L A2C: 49 ML/M2 (ref 16–34)
ECHO LA VOLUME INDEX A-L A4C: 35 ML/M2 (ref 16–34)
ECHO LA VOLUME INDEX AREA LENGTH: 46 ML/M2 (ref 16–34)
ECHO LA VOLUME INDEX MOD A2C: 47 ML/M2 (ref 16–34)
ECHO LA VOLUME INDEX MOD A4C: 34 ML/M2 (ref 16–34)
ECHO LV E' LATERAL VELOCITY: 12 CM/S
ECHO LV E' SEPTAL VELOCITY: 9 CM/S
ECHO LV EJECTION FRACTION A2C: 69 %
ECHO LV EJECTION FRACTION A4C: 60 %
ECHO LV EJECTION FRACTION BIPLANE: 65 % (ref 55–100)
ECHO LV FRACTIONAL SHORTENING: 51 % (ref 28–44)
ECHO LV INTERNAL DIMENSION DIASTOLE INDEX: 2.21 CM/M2
ECHO LV INTERNAL DIMENSION DIASTOLIC: 4.3 CM (ref 4.2–5.9)
ECHO LV INTERNAL DIMENSION SYSTOLIC INDEX: 1.08 CM/M2
ECHO LV INTERNAL DIMENSION SYSTOLIC: 2.1 CM
ECHO LV IVSD: 0.8 CM (ref 0.6–1)
ECHO LV MASS 2D: 114.2 G (ref 88–224)
ECHO LV MASS INDEX 2D: 58.5 G/M2 (ref 49–115)
ECHO LV POSTERIOR WALL DIASTOLIC: 0.9 CM (ref 0.6–1)
ECHO LV RELATIVE WALL THICKNESS RATIO: 0.42
ECHO LVOT AREA: 4.2 CM2
ECHO LVOT AV VTI INDEX: 0.37
ECHO LVOT DIAM: 2.3 CM
ECHO LVOT MEAN GRADIENT: 3 MMHG
ECHO LVOT PEAK GRADIENT: 5 MMHG
ECHO LVOT PEAK VELOCITY: 1.1 M/S
ECHO LVOT STROKE VOLUME INDEX: 58.1 ML/M2
ECHO LVOT SV: 113.4 ML
ECHO LVOT VTI: 27.3 CM
ECHO MV A VELOCITY: 1.12 M/S
ECHO MV E DECELERATION TIME (DT): 260.3 MS
ECHO MV E VELOCITY: 1.32 M/S
ECHO MV E/A RATIO: 1.18
ECHO MV E/E' LATERAL: 11
ECHO MV E/E' RATIO (AVERAGED): 12.83
ECHO RIGHT VENTRICULAR SYSTOLIC PRESSURE (RVSP): 37 MMHG
ECHO RV BASAL DIMENSION: 4.2 CM
ECHO RV FREE WALL PEAK S': 16 CM/S
ECHO RV TAPSE: 2.1 CM (ref 1.7–?)
ECHO TV REGURGITANT MAX VELOCITY: 2.92 M/S
ECHO TV REGURGITANT PEAK GRADIENT: 34 MMHG

## 2024-05-07 ENCOUNTER — TELEPHONE (OUTPATIENT)
Age: 79
End: 2024-05-07

## 2024-08-05 RX ORDER — FLECAINIDE ACETATE 100 MG/1
TABLET ORAL
Qty: 180 TABLET | Refills: 1 | Status: SHIPPED | OUTPATIENT
Start: 2024-08-05

## 2024-08-05 NOTE — TELEPHONE ENCOUNTER
Refill Request Received for the Following Medication     Requested Prescriptions     Pending Prescriptions Disp Refills    flecainide (TAMBOCOR) 100 MG tablet [Pharmacy Med Name: FLECAINIDE ACETATE 100 MG TAB] 180 tablet 2     Sig: TAKE 1 TABLET BY MOUTH TWICE A DAY       Last Prescribed: 09-    Last Appointment With Me:  4/2/2024     Future Appointments:  Future Appointments   Date Time Provider Department Center   10/8/2024  9:20 AM Pj Garrido MD [facility]      In office notes on 04-. Discussed interaction between fluoroquinolones (was prescribed once) and flecainide, and he will avoid fluoroquinolones, prefer none QT prolonging antibiotics-written on my card to provide to his urologist.

## 2024-08-26 ENCOUNTER — TELEPHONE (OUTPATIENT)
Age: 79
End: 2024-08-26

## 2024-08-26 DIAGNOSIS — I34.0 NONRHEUMATIC MITRAL VALVE REGURGITATION: ICD-10-CM

## 2024-08-26 DIAGNOSIS — I35.0 NONRHEUMATIC AORTIC (VALVE) STENOSIS: ICD-10-CM

## 2024-08-26 DIAGNOSIS — I10 HIGH BLOOD PRESSURE: ICD-10-CM

## 2024-08-26 DIAGNOSIS — I35.0 AORTIC STENOSIS, MILD: ICD-10-CM

## 2024-08-26 DIAGNOSIS — I10 ESSENTIAL (PRIMARY) HYPERTENSION: ICD-10-CM

## 2024-08-26 DIAGNOSIS — E78.2 MIXED HYPERLIPIDEMIA: Primary | ICD-10-CM

## 2024-08-26 DIAGNOSIS — I48.91 ATRIAL FIBRILLATION WITH RVR (HCC): ICD-10-CM

## 2024-08-26 RX ORDER — DILTIAZEM HYDROCHLORIDE 180 MG/1
CAPSULE, COATED, EXTENDED RELEASE ORAL DAILY
Qty: 90 CAPSULE | Refills: 2 | Status: SHIPPED | OUTPATIENT
Start: 2024-08-26

## 2024-08-26 RX ORDER — APIXABAN 5 MG/1
5 TABLET, FILM COATED ORAL 2 TIMES DAILY
Qty: 180 TABLET | Refills: 3 | OUTPATIENT
Start: 2024-08-26

## 2024-08-26 NOTE — TELEPHONE ENCOUNTER
Patient states he would like Elvia to reach out to pharmacy about getting a new prescription for his medication (Eliquis 5 MG) he says he received a message stating that  will not fill it.      [facility] [phone]

## 2024-08-26 NOTE — TELEPHONE ENCOUNTER
Refill Request Received for the Following Medication     Requested Prescriptions     Refused Prescriptions Disp Refills    dilTIAZem (CARDIZEM CD) 180 MG extended release capsule [Pharmacy Med Name: DILTIAZEM 24H ER(CD) 180 MG CP] 90 capsule 2     Sig: TAKE 1 CAPSULE BY MOUTH EVERY DAY    ELIQUIS 5 MG TABS tablet [Pharmacy Med Name: ELIQUIS 5 MG TABLET] 180 tablet 3     Sig: TAKE 1 TABLET BY MOUTH TWICE A DAY       Last Prescribed:09-    Last Appointment With Me:  4/2/2024     Future Appointments:  Future Appointments   Date Time Provider Department Center   10/8/2024  9:20 AM Pj Garrido MD [facility]

## 2024-08-27 NOTE — TELEPHONE ENCOUNTER
Spoke with patient, verified with 2 identifiers.   This nurse explained to patient that Eliquis was prescribed for a year last October. Patient will call [facility] pharmacy for refills.  Also this nurse advised patient that last blood work (CBC and cmp) was done more than a year ago.   Patient will do Blood work done prior to appointment, stated.    Lab slips mailed to address on file.

## 2024-09-05 DIAGNOSIS — I35.0 NONRHEUMATIC AORTIC (VALVE) STENOSIS: ICD-10-CM

## 2024-09-05 DIAGNOSIS — I34.0 NONRHEUMATIC MITRAL VALVE REGURGITATION: ICD-10-CM

## 2024-09-05 DIAGNOSIS — E78.2 MIXED HYPERLIPIDEMIA: ICD-10-CM

## 2024-09-05 DIAGNOSIS — I10 HIGH BLOOD PRESSURE: ICD-10-CM

## 2024-09-05 DIAGNOSIS — I10 ESSENTIAL (PRIMARY) HYPERTENSION: ICD-10-CM

## 2024-09-05 DIAGNOSIS — I48.91 ATRIAL FIBRILLATION WITH RVR (HCC): ICD-10-CM

## 2024-09-05 DIAGNOSIS — I35.0 AORTIC STENOSIS, MILD: ICD-10-CM

## 2024-09-06 LAB
ALBUMIN SERPL-MCNC: 3.8 G/DL (ref 3.5–5)
ALBUMIN/GLOB SERPL: 1.4 (ref 1.1–2.2)
ALP SERPL-CCNC: 62 U/L (ref 45–117)
ALT SERPL-CCNC: 38 U/L (ref 12–78)
ANION GAP SERPL CALC-SCNC: 1 MMOL/L (ref 2–12)
AST SERPL-CCNC: 27 U/L (ref 15–37)
BILIRUB SERPL-MCNC: 0.2 MG/DL (ref 0.2–1)
BUN SERPL-MCNC: 27 MG/DL (ref 6–20)
BUN/CREAT SERPL: 29 (ref 12–20)
CALCIUM SERPL-MCNC: 9.2 MG/DL (ref 8.5–10.1)
CHLORIDE SERPL-SCNC: 101 MMOL/L (ref 97–108)
CO2 SERPL-SCNC: 34 MMOL/L (ref 21–32)
CREAT SERPL-MCNC: 0.93 MG/DL (ref 0.7–1.3)
ERYTHROCYTE [DISTWIDTH] IN BLOOD BY AUTOMATED COUNT: 14.3 % (ref 11.5–14.5)
GLOBULIN SER CALC-MCNC: 2.7 G/DL (ref 2–4)
GLUCOSE SERPL-MCNC: 102 MG/DL (ref 65–100)
HCT VFR BLD AUTO: 34 % (ref 36.6–50.3)
HGB BLD-MCNC: 10.9 G/DL (ref 12.1–17)
MCH RBC QN AUTO: 29.7 PG (ref 26–34)
MCHC RBC AUTO-ENTMCNC: 32.1 G/DL (ref 30–36.5)
MCV RBC AUTO: 92.6 FL (ref 80–99)
NRBC # BLD: 0 K/UL (ref 0–0.01)
NRBC BLD-RTO: 0 PER 100 WBC
PLATELET # BLD AUTO: 273 K/UL (ref 150–400)
PMV BLD AUTO: 9.5 FL (ref 8.9–12.9)
POTASSIUM SERPL-SCNC: 4.3 MMOL/L (ref 3.5–5.1)
PROT SERPL-MCNC: 6.5 G/DL (ref 6.4–8.2)
RBC # BLD AUTO: 3.67 M/UL (ref 4.1–5.7)
SODIUM SERPL-SCNC: 136 MMOL/L (ref 136–145)
WBC # BLD AUTO: 6.8 K/UL (ref 4.1–11.1)

## 2024-09-09 ENCOUNTER — TELEPHONE (OUTPATIENT)
Age: 79
End: 2024-09-09

## 2024-09-09 NOTE — TELEPHONE ENCOUNTER
----- Message from Dr. Pj Garrido MD sent at 9/7/2024  2:26 PM EDT -----  Please advise kidney function electrolytes, liver functions are stable.  Blood count, or hemoglobin is just a little bit lower than last year 1 point down from 12-11.  Sometimes Eliquis is contributing to this.  We just need to discuss at his upcoming appointment if he has had colonoscopy, any bloody or black stools, blood in urine or any other bleeding concerns.  Otherwise this is a small change over 1 year and there is no rush to work it up but if there were any source of slow blood loss, we could always consider whether he would benefit from a Watchman device to eliminate the need for long-term Eliquis.  Copy to Dr. Benton for consideration.  Let me know if he would like for me to discussed with him with a phone call.    Future Appointments  10/8/2024  9:20 AM    Pj Garrido MD [facility]     Spoke with patient, verified with 2 identifiers. Mr Brock stated to be aware of message and he will be here next week for his appointment with dr. Garrido.

## 2024-09-10 ENCOUNTER — TELEPHONE (OUTPATIENT)
Age: 79
End: 2024-09-10

## 2024-10-08 ENCOUNTER — OFFICE VISIT (OUTPATIENT)
Age: 79
End: 2024-10-08
Payer: MEDICARE

## 2024-10-08 VITALS
DIASTOLIC BLOOD PRESSURE: 58 MMHG | HEIGHT: 71 IN | SYSTOLIC BLOOD PRESSURE: 142 MMHG | WEIGHT: 169.4 LBS | HEART RATE: 83 BPM | OXYGEN SATURATION: 100 % | BODY MASS INDEX: 23.72 KG/M2

## 2024-10-08 DIAGNOSIS — I35.0 NONRHEUMATIC AORTIC (VALVE) STENOSIS: ICD-10-CM

## 2024-10-08 DIAGNOSIS — I34.0 NONRHEUMATIC MITRAL VALVE REGURGITATION: ICD-10-CM

## 2024-10-08 DIAGNOSIS — I49.9 IRREGULAR HEART BEAT: ICD-10-CM

## 2024-10-08 DIAGNOSIS — I10 ESSENTIAL (PRIMARY) HYPERTENSION: ICD-10-CM

## 2024-10-08 DIAGNOSIS — I35.0 AORTIC STENOSIS, MILD: ICD-10-CM

## 2024-10-08 DIAGNOSIS — I48.0 PAF (PAROXYSMAL ATRIAL FIBRILLATION) (HCC): Primary | ICD-10-CM

## 2024-10-08 DIAGNOSIS — E78.2 MIXED HYPERLIPIDEMIA: ICD-10-CM

## 2024-10-08 PROCEDURE — 3077F SYST BP >= 140 MM HG: CPT | Performed by: INTERNAL MEDICINE

## 2024-10-08 PROCEDURE — 93010 ELECTROCARDIOGRAM REPORT: CPT | Performed by: INTERNAL MEDICINE

## 2024-10-08 PROCEDURE — 3078F DIAST BP <80 MM HG: CPT | Performed by: INTERNAL MEDICINE

## 2024-10-08 PROCEDURE — 1036F TOBACCO NON-USER: CPT | Performed by: INTERNAL MEDICINE

## 2024-10-08 PROCEDURE — 93005 ELECTROCARDIOGRAM TRACING: CPT | Performed by: INTERNAL MEDICINE

## 2024-10-08 PROCEDURE — G8427 DOCREV CUR MEDS BY ELIG CLIN: HCPCS | Performed by: INTERNAL MEDICINE

## 2024-10-08 PROCEDURE — 99214 OFFICE O/P EST MOD 30 MIN: CPT | Performed by: INTERNAL MEDICINE

## 2024-10-08 PROCEDURE — G8484 FLU IMMUNIZE NO ADMIN: HCPCS | Performed by: INTERNAL MEDICINE

## 2024-10-08 PROCEDURE — G8420 CALC BMI NORM PARAMETERS: HCPCS | Performed by: INTERNAL MEDICINE

## 2024-10-08 PROCEDURE — 1123F ACP DISCUSS/DSCN MKR DOCD: CPT | Performed by: INTERNAL MEDICINE

## 2024-10-08 ASSESSMENT — PATIENT HEALTH QUESTIONNAIRE - PHQ9
1. LITTLE INTEREST OR PLEASURE IN DOING THINGS: NOT AT ALL
SUM OF ALL RESPONSES TO PHQ9 QUESTIONS 1 & 2: 0
SUM OF ALL RESPONSES TO PHQ QUESTIONS 1-9: 0
2. FEELING DOWN, DEPRESSED OR HOPELESS: NOT AT ALL
SUM OF ALL RESPONSES TO PHQ QUESTIONS 1-9: 0

## 2024-10-08 NOTE — PROGRESS NOTES
[address] [place] [address] [phone]    [address] [place] [address]     Subjective:        Ryan Brock is a 79 y.o. male is here for routine f/u.  The patient denies chest pain/ shortness of breath, orthopnea, PND, LE edema, palpitations, syncope, presyncope or fatigue.    Patient Active Problem List    Diagnosis Date Noted    Senile purpura (HCC) 11/10/2022    Secondary hypercoagulable state (HCC) 11/10/2022    BCC (basal cell carcinoma of skin)     Irregular heart beat 09/21/2016    Aortic stenosis, mild 02/25/2016    PAF (paroxysmal atrial fibrillation) (HCC) 02/25/2016    Mitral regurgitation 07/06/2015    Colon polyps 03/13/2015    Mixed hyperlipidemia 05/17/2012    Atrial flutter (HCC) 03/06/2012    Atrial fibrillation with RVR (HCC) 01/09/2012    Anemia 07/20/2011    Hyperlipidemia 07/19/2011    BPH (benign prostatic hyperplasia) 07/19/2011      Chetan Larsen MD  Past Medical History:   Diagnosis Date    Anemia 7/20/2011    Arrhythmia     A-flutter    Atrial fibrillation (HCC)     Atrial flutter (HCC)     BCC (basal cell carcinoma of skin)     BPH (benign prostatic hyperplasia) 7/19/2011    Colon polyps 3/13/2015    Tubular adenomas    Essential hypertension     Hyperlipidemia 7/19/2011    Long term current use of anticoagulant therapy     Murmur     Other acute and subacute form of ischemic heart disease     Other ill-defined conditions(799.89) 1996    fractured ribs     Palpitations     Valvular heart disease       Past Surgical History:   Procedure Laterality Date    CARDIAC CATH PROCEDURE  2/2/2012         CARDIAC CATHETERIZATION      HERNIA REPAIR  12/07/2016    RI WITH MESH    ORTHOPEDIC SURGERY  2011    left bunionectomy and hammertoe repair    OTHER SURGICAL HISTORY      cardiac ablation 3/7/12    [place] UNLISTED PROCEDURE CARDIAC SURGERY      cardiac cath    TONSILLECTOMY       No Known Allergies   Family History   Problem Relation Age of Onset

## 2024-12-02 RX ORDER — FLECAINIDE ACETATE 100 MG/1
TABLET ORAL
Qty: 180 TABLET | Refills: 1 | OUTPATIENT
Start: 2024-12-02

## 2025-01-29 ENCOUNTER — TELEPHONE (OUTPATIENT)
Age: 80
End: 2025-01-29

## 2025-01-29 NOTE — TELEPHONE ENCOUNTER
Pre-Procedure Cardiac Clearance Request:    Facility: [facility]    Procedure Date: 02-    Procedure:L RF, SF subtotoal faseciectomy    Surgeon: Jose Velasquez MD    Anesthesia : Rgional block and Mac    Request for Interruption of Anticoagulants:  Eliquis    May stop anticoagulant how many days prior and when to resume    Is patient cleared from a cardiac standpoint to proceed with upcoming procedure.   Please advise.

## 2025-01-30 ENCOUNTER — HOSPITAL ENCOUNTER (OUTPATIENT)
Facility: HOSPITAL | Age: 80
Discharge: HOME | End: 2025-01-30
Payer: MEDICARE

## 2025-01-30 ENCOUNTER — TELEPHONE (OUTPATIENT)
Age: 80
End: 2025-01-30

## 2025-01-30 VITALS
HEIGHT: 68 IN | RESPIRATION RATE: 16 BRPM | BODY MASS INDEX: 25.56 KG/M2 | WEIGHT: 168.65 LBS | HEART RATE: 63 BPM | TEMPERATURE: 97.3 F | OXYGEN SATURATION: 100 %

## 2025-01-30 LAB
ANION GAP SERPL CALC-SCNC: 2 MMOL/L (ref 2–12)
BUN SERPL-MCNC: 23 MG/DL (ref 6–20)
BUN/CREAT SERPL: 23 (ref 12–20)
CALCIUM SERPL-MCNC: 9.4 MG/DL (ref 8.5–10.1)
CHLORIDE SERPL-SCNC: 103 MMOL/L (ref 97–108)
CO2 SERPL-SCNC: 30 MMOL/L (ref 21–32)
CREAT SERPL-MCNC: 0.98 MG/DL (ref 0.7–1.3)
EKG ATRIAL RATE: 62 BPM
EKG DIAGNOSIS: NORMAL
EKG P AXIS: 72 DEGREES
EKG P-R INTERVAL: 234 MS
EKG Q-T INTERVAL: 410 MS
EKG QRS DURATION: 114 MS
EKG QTC CALCULATION (BAZETT): 416 MS
EKG R AXIS: -16 DEGREES
EKG T AXIS: 55 DEGREES
EKG VENTRICULAR RATE: 62 BPM
GLUCOSE SERPL-MCNC: 93 MG/DL (ref 65–100)
POTASSIUM SERPL-SCNC: 4.3 MMOL/L (ref 3.5–5.1)
SODIUM SERPL-SCNC: 135 MMOL/L (ref 136–145)

## 2025-01-30 PROCEDURE — 93005 ELECTROCARDIOGRAM TRACING: CPT | Performed by: ORTHOPAEDIC SURGERY

## 2025-01-30 PROCEDURE — 93010 ELECTROCARDIOGRAM REPORT: CPT | Performed by: SPECIALIST

## 2025-01-30 PROCEDURE — 36415 COLL VENOUS BLD VENIPUNCTURE: CPT

## 2025-01-30 PROCEDURE — 80048 BASIC METABOLIC PNL TOTAL CA: CPT

## 2025-01-30 NOTE — TELEPHONE ENCOUNTER
The patient is at low cardiac risk to proceed with the procedure, and can interrupt blood thinners if necessary, for the following length of time individualized for each blood thinner, with the shorter and or longer end of the duration depending on surgical bleeding risk as per the proceduralist/surgeon:    Eliquis/Xarelto/Pradaxa/Savaysa: 2 to 3 days    Please advise that whenever a blood thinner is temporarily stopped for the treatment of atrial fibrillation, there is always a tiny (less than 1%) chance of a stroke, and even less for people that are not always in atrial fibrillation.  Usually, the benefit of interrupting the blood thinner for the procedure exceeds the risk.      Resume blood thinners as per the surgeon/proceduralist when felt to be safe from a surgical/procedural standpoint.

## 2025-01-30 NOTE — TELEPHONE ENCOUNTER
Clearance note, recent progress note and EKG faxed to [facility], office of dr. Velasquez at [phone].    Also a My chart message sent to patient.

## 2025-01-30 NOTE — TELEPHONE ENCOUNTER
Patient needs release to prepare for upcoming Dupuytren hand surgery feb 11, 2025 performed at [facility]    Jose Saravia MD of [facility] [address]  [address] [place] [address] [phone] - Physician Appointments  [phone] - Therapy Appointments  [phone] -   [phone] - Therapy   [phone] - Office Operatory  [phone] - Fax  [phone] - Therapy Fax  [phone] - Office Operatory Fax      Please follow up with patient at [phone] to follow up and give update once cardiac clearance resolved

## 2025-01-30 NOTE — DISCHARGE INSTRUCTIONS
[facility]                   [address] [place] [address]   MAIN OR                                  [phone]   MAIN PRE OP                          [phone]                                                                                AMBULATORY PRE OP          [phone]  PRE-ADMISSION TESTING    [phone]   Surgery Date:  2/11/25Tuesday       Is surgery arrival time given by surgeon?  YES  NO  If “NO”, [facility] staff will call you between 3 and 7pm the day before your surgery with your arrival time. (If your surgery is on a Monday, we will call you the Friday before.)    Call [phone] after 7pm Monday-Friday if you did not receive this call.    INSTRUCTIONS BEFORE YOUR SURGERY   When You  Arrive Arrive at the 2nd Floor Admitting Desk on the day of your surgery  Have your insurance card, photo ID, and any copayment (if needed)   Food   and   Drink No food or drink (gum , mints, coffee, juice, etc)after midnight the night before surgery.   You may drink WATER ONLY up until 2 hours prior to your surgery time. Please do not add anything to your water as this may result in your surgery being postponed.    No alcohol (beer, wine, liquor) 24 hours before and after surgery   Medications to   TAKE   Morning of Surgery MEDICATIONS TO TAKE THE MORNING OF SURGERY WITH A SIP OF WATER:   Flecanide  Diltiazem   Medications  To  STOP      7 days before surgery Non-Steroidal anti-inflammatory Drugs (NSAID's): for example, Ibuprofen (Advil, Motrin), Naproxen (Aleve)  Aspirin, if taking for pain   Herbal supplements, vitamins, and fish oil  Other:  (Pain medications not listed above, including Tylenol may be taken)   Blood  Thinners If you take  Aspirin, Plavix, Coumadin, or any blood-thinning or anti-blood clot medicine, talk to the doctor who prescribed the medications for pre-operative instructions.   Bathing Clothing  Jewelry  Valuables     If you shower the

## 2025-01-30 NOTE — PERIOP NOTE
Pt to call Cardiologist for clearance, has been seen 10/24.     Medication plan sent to cardiologist for Eliquis instructions.

## 2025-01-31 ENCOUNTER — TELEPHONE (OUTPATIENT)
Age: 80
End: 2025-01-31

## 2025-01-31 NOTE — PERIOP NOTE
Attempted to call patient and give preoperative Eliquis instructions; no answer, left voice mail to please call PAT at [phone].

## 2025-01-31 NOTE — TELEPHONE ENCOUNTER
Cardiac note for upcoming orthopaedic procedure faxed to [facility] pre testing lab at [phone].

## 2025-02-10 ENCOUNTER — ANESTHESIA EVENT (OUTPATIENT)
Facility: HOSPITAL | Age: 80
End: 2025-02-10
Payer: MEDICARE

## 2025-02-10 NOTE — PERIOP NOTE
Hello,     You are scheduled to have surgery tomorrow at [facility].     We would like for you to arrive at  0915 am  We are located on the second floor, [address]. You will check-in at the registration desk located outside the elevators on the second floor prior to proceeding to [address].  Remember nothing to eat or drink after midnight. If you need to take medications the morning of surgery, please take with a few sips of water.   Wear loose, comfortable clothing and leave all your jewelry at home.   You may bring your cell phone with you.  One family member will be allowed in the pre-op area once you are dressed and your IV has been started.   You will need someone to drive you home and be with you for 24 hours post-anesthesia.     We look forward to seeing you! Call [phone] for questions after hours and [phone] between 5:30AM and 6PM.     Thanks!    [facility] PREOP TEAM

## 2025-02-11 ENCOUNTER — HOSPITAL ENCOUNTER (OUTPATIENT)
Facility: HOSPITAL | Age: 80
Setting detail: OUTPATIENT SURGERY
Discharge: HOME | End: 2025-02-11
Attending: ORTHOPAEDIC SURGERY | Admitting: ORTHOPAEDIC SURGERY
Payer: MEDICARE

## 2025-02-11 ENCOUNTER — ANESTHESIA (OUTPATIENT)
Facility: HOSPITAL | Age: 80
End: 2025-02-11
Payer: MEDICARE

## 2025-02-11 VITALS
HEART RATE: 65 BPM | HEIGHT: 68 IN | WEIGHT: 164.02 LBS | OXYGEN SATURATION: 94 % | BODY MASS INDEX: 24.86 KG/M2 | DIASTOLIC BLOOD PRESSURE: 82 MMHG | SYSTOLIC BLOOD PRESSURE: 132 MMHG | RESPIRATION RATE: 17 BRPM | TEMPERATURE: 98.1 F

## 2025-02-11 PROCEDURE — 64417 NJX AA&/STRD AX NERVE IMG: CPT | Performed by: ANESTHESIOLOGY

## 2025-02-11 PROCEDURE — 6360000002 HC RX W HCPCS: Performed by: NURSE ANESTHETIST, CERTIFIED REGISTERED

## 2025-02-11 PROCEDURE — 6360000002 HC RX W HCPCS: Performed by: ORTHOPAEDIC SURGERY

## 2025-02-11 PROCEDURE — 6370000000 HC RX 637 (ALT 250 FOR IP): Performed by: ORTHOPAEDIC SURGERY

## 2025-02-11 PROCEDURE — 3600000002 HC SURGERY LEVEL 2 BASE: Performed by: ORTHOPAEDIC SURGERY

## 2025-02-11 PROCEDURE — 2709999900 HC NON-CHARGEABLE SUPPLY: Performed by: ORTHOPAEDIC SURGERY

## 2025-02-11 PROCEDURE — 7100000010 HC PHASE II RECOVERY - FIRST 15 MIN: Performed by: ORTHOPAEDIC SURGERY

## 2025-02-11 PROCEDURE — 7100000000 HC PACU RECOVERY - FIRST 15 MIN: Performed by: ORTHOPAEDIC SURGERY

## 2025-02-11 PROCEDURE — 2500000003 HC RX 250 WO HCPCS: Performed by: ORTHOPAEDIC SURGERY

## 2025-02-11 PROCEDURE — 7100000011 HC PHASE II RECOVERY - ADDTL 15 MIN: Performed by: ORTHOPAEDIC SURGERY

## 2025-02-11 PROCEDURE — 3600000012 HC SURGERY LEVEL 2 ADDTL 15MIN: Performed by: ORTHOPAEDIC SURGERY

## 2025-02-11 PROCEDURE — 88305 TISSUE EXAM BY PATHOLOGIST: CPT

## 2025-02-11 PROCEDURE — 2580000003 HC RX 258: Performed by: ANESTHESIOLOGY

## 2025-02-11 PROCEDURE — 2500000003 HC RX 250 WO HCPCS: Performed by: NURSE ANESTHETIST, CERTIFIED REGISTERED

## 2025-02-11 PROCEDURE — 6360000002 HC RX W HCPCS: Performed by: ANESTHESIOLOGY

## 2025-02-11 PROCEDURE — 7100000001 HC PACU RECOVERY - ADDTL 15 MIN: Performed by: ORTHOPAEDIC SURGERY

## 2025-02-11 PROCEDURE — 3700000001 HC ADD 15 MINUTES (ANESTHESIA): Performed by: ORTHOPAEDIC SURGERY

## 2025-02-11 PROCEDURE — 3700000000 HC ANESTHESIA ATTENDED CARE: Performed by: ORTHOPAEDIC SURGERY

## 2025-02-11 RX ORDER — LIDOCAINE HYDROCHLORIDE 10 MG/ML
1 INJECTION, SOLUTION EPIDURAL; INFILTRATION; INTRACAUDAL; PERINEURAL
Status: DISCONTINUED | OUTPATIENT
Start: 2025-02-11 | End: 2025-02-11 | Stop reason: HOSPADM

## 2025-02-11 RX ORDER — DIPHENHYDRAMINE HYDROCHLORIDE 50 MG/ML
12.5 INJECTION INTRAMUSCULAR; INTRAVENOUS
Status: DISCONTINUED | OUTPATIENT
Start: 2025-02-11 | End: 2025-02-11 | Stop reason: HOSPADM

## 2025-02-11 RX ORDER — SODIUM CHLORIDE, SODIUM LACTATE, POTASSIUM CHLORIDE, CALCIUM CHLORIDE 600; 310; 30; 20 MG/100ML; MG/100ML; MG/100ML; MG/100ML
INJECTION, SOLUTION INTRAVENOUS CONTINUOUS
Status: DISCONTINUED | OUTPATIENT
Start: 2025-02-11 | End: 2025-02-11 | Stop reason: HOSPADM

## 2025-02-11 RX ORDER — BACITRACIN ZINC 500 [USP'U]/G
OINTMENT TOPICAL PRN
Status: DISCONTINUED | OUTPATIENT
Start: 2025-02-11 | End: 2025-02-11 | Stop reason: HOSPADM

## 2025-02-11 RX ORDER — ONDANSETRON 2 MG/ML
4 INJECTION INTRAMUSCULAR; INTRAVENOUS
Status: DISCONTINUED | OUTPATIENT
Start: 2025-02-11 | End: 2025-02-11 | Stop reason: HOSPADM

## 2025-02-11 RX ORDER — FENTANYL CITRATE 50 UG/ML
100 INJECTION, SOLUTION INTRAMUSCULAR; INTRAVENOUS
Status: DISCONTINUED | OUTPATIENT
Start: 2025-02-11 | End: 2025-02-11 | Stop reason: HOSPADM

## 2025-02-11 RX ORDER — EPHEDRINE SULFATE/0.9% NACL/PF 25 MG/5 ML
SYRINGE (ML) INTRAVENOUS
Status: DISCONTINUED | OUTPATIENT
Start: 2025-02-11 | End: 2025-02-11 | Stop reason: SDUPTHER

## 2025-02-11 RX ORDER — SODIUM CHLORIDE 9 MG/ML
INJECTION, SOLUTION INTRAVENOUS CONTINUOUS
Status: DISCONTINUED | OUTPATIENT
Start: 2025-02-11 | End: 2025-02-11 | Stop reason: HOSPADM

## 2025-02-11 RX ORDER — NALOXONE HYDROCHLORIDE 0.4 MG/ML
INJECTION, SOLUTION INTRAMUSCULAR; INTRAVENOUS; SUBCUTANEOUS PRN
Status: DISCONTINUED | OUTPATIENT
Start: 2025-02-11 | End: 2025-02-11 | Stop reason: HOSPADM

## 2025-02-11 RX ORDER — MIDAZOLAM HYDROCHLORIDE 2 MG/2ML
2 INJECTION, SOLUTION INTRAMUSCULAR; INTRAVENOUS
Status: DISCONTINUED | OUTPATIENT
Start: 2025-02-11 | End: 2025-02-11 | Stop reason: HOSPADM

## 2025-02-11 RX ORDER — ROPIVACAINE HYDROCHLORIDE 5 MG/ML
INJECTION, SOLUTION EPIDURAL; INFILTRATION; PERINEURAL
Status: DISCONTINUED | OUTPATIENT
Start: 2025-02-11 | End: 2025-02-11 | Stop reason: SDUPTHER

## 2025-02-11 RX ORDER — PROPOFOL 10 MG/ML
INJECTION, EMULSION INTRAVENOUS
Status: DISCONTINUED | OUTPATIENT
Start: 2025-02-11 | End: 2025-02-11 | Stop reason: SDUPTHER

## 2025-02-11 RX ORDER — FENTANYL CITRATE 50 UG/ML
INJECTION, SOLUTION INTRAMUSCULAR; INTRAVENOUS
Status: DISCONTINUED | OUTPATIENT
Start: 2025-02-11 | End: 2025-02-11 | Stop reason: SDUPTHER

## 2025-02-11 RX ADMIN — ROPIVACAINE HYDROCHLORIDE 15 ML: 5 INJECTION, SOLUTION EPIDURAL; INFILTRATION; PERINEURAL at 10:58

## 2025-02-11 RX ADMIN — WATER 2000 MG: 1 INJECTION INTRAMUSCULAR; INTRAVENOUS; SUBCUTANEOUS at 11:25

## 2025-02-11 RX ADMIN — FENTANYL CITRATE 100 MCG: 50 INJECTION, SOLUTION INTRAMUSCULAR; INTRAVENOUS at 10:51

## 2025-02-11 RX ADMIN — Medication 5 MG: at 12:50

## 2025-02-11 RX ADMIN — SODIUM CHLORIDE: 0.9 INJECTION, SOLUTION INTRAVENOUS at 10:20

## 2025-02-11 RX ADMIN — MEPIVACAINE HYDROCHLORIDE 15 ML: 15 INJECTION, SOLUTION EPIDURAL; INFILTRATION at 10:58

## 2025-02-11 RX ADMIN — PROPOFOL 50 MCG/KG/MIN: 10 INJECTION, EMULSION INTRAVENOUS at 11:26

## 2025-02-11 RX ADMIN — PROPOFOL 50 MG: 10 INJECTION, EMULSION INTRAVENOUS at 11:25

## 2025-02-11 RX ADMIN — Medication 5 MG: at 12:37

## 2025-02-11 ASSESSMENT — PAIN - FUNCTIONAL ASSESSMENT
PAIN_FUNCTIONAL_ASSESSMENT: 0-10
PAIN_FUNCTIONAL_ASSESSMENT: NONE - DENIES PAIN

## 2025-02-11 NOTE — DISCHARGE INSTRUCTIONS
implies, anesthesia (decreased or no pain, sensation, or motor control) has been provided to a specific region, whether that be an arm or a leg.  “How does this happen?” you might ask.    While you were sleepy, the anesthesia provider provided medicine to a specific group of nerves either in the neck/shoulder region or in the groin and/or buttock region, similar to the way a dentist might numb a tooth (teeth.)  They typically use an ultrasound machine to know where the medicine is placed in relation to the nerves they wish to “numb up” or “block.”  What this means is that for the next few hours, you should expect to have a numb limb.    Below are some things we wish for you to read and be familiar with concerning your anesthetized limb.    Caring For a Blocked Body Part    General Considerations:  The numbness may last up to 24 hours  You must protect your arm or leg.  The blocked extremity is numb, weak, and difficult for your brain to locate and communicate with.  To do this you should:  Pay attention to the position of the blocked limb at all times.  Be very careful when placing hot or cod items on a numb limb.  You could cause tissue damage like burns or frostbite if you are unable to feel temperature.  Carefully follow your discharge instructions regarding the use of these therapies in you post-operative care.  Carefully pad the affected limb.  Normally we continually move and adjust the position of our bodies without thinking about it.  This movement and continuous repositioning helps to prevent injuries from immobility.  When a limb is numb it still requires this care  Be extremely careful not to bump or hit the numb body part.  This can result in an unrecognized injury, at lease until the blocked limb wakes up.  It can also result in worse pain of your already post-surgical limb.    Arm/Shoulder Blocks:  You may experience a droopy eyelid, nasal stuffiness, and redness of the eye after receiving an

## 2025-02-11 NOTE — ANESTHESIA POSTPROCEDURE EVALUATION
Department of Anesthesiology  Postprocedure Note    Patient: Ryan Brock  MRN: [id number]  YOB: 1945  Date of evaluation: 2/11/2025    Procedure Summary       Date: 02/11/25 Room / Location: [facility] OR [address] AMBULATORY OR    Anesthesia Start: 1119 Anesthesia Stop: 1259    Procedure: LEFT RING AND SMALL FINGERS SUBTOTAL FASCIECTOMY WITH DUPUYTRENS CONTRACTURE RELEASE (MAC WITH REGIONAL BLOCK) (Left: Hand) Diagnosis:       Dupuytren's contracture      (Dupuytren's contracture [M72.0])    Surgeons: Jose Saravia MD Responsible Provider: Arjun Black MD    Anesthesia Type: Regional, MAC ASA Status: 3            Anesthesia Type: Regional, MAC    Sincere Phase I: Sincere Score: 9    Sincere Phase II:      Anesthesia Post Evaluation    Patient location during evaluation: PACU  Patient participation: complete - patient participated  Level of consciousness: awake and alert  Pain score: 0  Airway patency: patent  Nausea & Vomiting: no nausea and no vomiting  Cardiovascular status: blood pressure returned to baseline  Respiratory status: acceptable  Hydration status: euvolemic  Pain management: satisfactory to patient    No notable events documented.

## 2025-02-11 NOTE — ANESTHESIA PROCEDURE NOTES
Peripheral Block    Patient location during procedure: pre-op  Reason for block: procedure for pain, post-op pain management, primary anesthetic and at surgeon's request  Start time: 2/11/2025 10:51 AM  End time: 2/11/2025 10:58 AM  Staffing  Performed: resident/CRNA   Resident/CRNA: Timothy Hussein APRN - CRNA  Performed by: Timothy Hussein APRN - CRNA  Authorized by: Librado Dugan MD    Preanesthetic Checklist  Completed: patient identified, IV checked, site marked, risks and benefits discussed, surgical/procedural consents, pre-op evaluation, timeout performed, anesthesia consent given, oxygen available and monitors applied/VS acknowledged  Peripheral Block   Patient position: supine  Prep: ChloraPrep  Provider prep: mask and sterile gloves  Patient monitoring: cardiac monitor, continuous pulse ox, continuous capnometry, frequent blood pressure checks, IV access, oxygen and responsive to questions  Block type: Axillary  L axillary  Laterality: left  Injection technique: single-shot  Guidance: ultrasound guided    Needle   Needle type: Other   Needle gauge: 22 G  Needle localization: ultrasound guidance  Needle length: 5 cmOther needle type: STIMUPLEX  Assessment   Injection assessment: negative aspiration for heme, no paresthesia on injection, local visualized surrounding nerve on ultrasound and no intravascular symptoms  Hemodynamics: stable  Outcomes: patient tolerated procedure well    Additional Notes  Margy TUCKER witnessed timeout and block written on correct side.

## 2025-02-11 NOTE — BRIEF OP NOTE
Brief Postoperative Note      Patient: Ryan Brock  YOB: 1945  MRN: [id number]    Date of Procedure: 2/11/2025    Pre-Op Diagnosis Codes:      * Dupuytren's contracture [M72.0]    Post-Op Diagnosis: Same       Procedure(s):  LEFT RING AND SMALL FINGERS SUBTOTAL FASCIECTOMY WITH DUPUYTRENS CONTRACTURE RELEASE (MAC WITH REGIONAL BLOCK)    Surgeon(s):  Jose Saravia MD    Assistant:  Physician Assistant: Nishi Stewart PA    Anesthesia: Monitor Anesthesia Care    Estimated Blood Loss (mL): Minimal    Complications: None    Specimens:   * No specimens in log *    Implants:  * No implants in log *      Drains: * No LDAs found *    Findings:  Infection Present At Time Of Surgery (PATOS) (choose all levels that have infection present):  No infection present  Other Findings: Left ring and small finger dupuytren's     Electronically signed by GHISLAINE Marquez on 2/11/2025 at 11:53 AM

## 2025-02-11 NOTE — ANESTHESIA PRE PROCEDURE
Department of Anesthesiology  Preprocedure Note       Name:  Ryan Brock   Age:  79 y.o.  :  1945                                          MRN:  [id number]         Date:  2025      Surgeon: Surgeon(s):  Jose Saravia MD    Procedure: Procedure(s):  LEFT RING AND SMALL FINGERS SUBTOTAL FASCIECTOMY WITH DUPUYTRENS CONTRACTURE RELEASE (MAC WITH REGIONAL BLOCK)    Medications prior to admission:   Prior to Admission medications    Medication Sig Start Date End Date Taking? Authorizing Provider   Coenzyme Q10 (CO Q 10 PO) Take 200 mg by mouth   Yes Christine Stone MD   albuterol sulfate HFA (PROVENTIL;VENTOLIN;PROAIR) 108 (90 Base) MCG/ACT inhaler TAKE 1 PUFF BY INHALATION EVERY SIX HOURS AS NEEDED FOR WHEEZING. 25  Yes Chetan Larsen MD   dilTIAZem (CARDIZEM CD) 180 MG extended release capsule TAKE 1 CAPSULE BY MOUTH EVERY DAY 24  Yes Pj Garrido MD   flecainide (TAMBOCOR) 100 MG tablet TAKE 1 TABLET BY MOUTH TWICE A DAY 24  Yes Pj Garrido MD   losartan (COZAAR) 25 MG tablet Take 1 tablet by mouth daily 24  Yes Pj Garrido MD   melatonin 3 MG TABS tablet Take 10 mg by mouth daily    Christine Stone MD   Ibuprofen-diphenhydrAMINE Cit (ADVIL PM PO) Take 2 tablets by mouth at bedtime    Christine Stone MD   apixaban (ELIQUIS) 5 MG TABS tablet Take 1 tablet by mouth 2 times daily  Patient taking differently: Take 2 tablets by mouth 2 times daily 10/8/24   Pj Garrido MD   montelukast (SINGULAIR) 10 MG tablet TAKE 1 TABLET BY MOUTH EVERY DAY 24   Chetan Larsen MD   atorvastatin (LIPITOR) 10 MG tablet TAKE 1 TABLET BY MOUTH EVERY DAY 24   Chetan Larsen MD   fluticasone (FLONASE) 50 MCG/ACT nasal spray 1 spray by Each Nostril route daily    Christine Stone MD   Cholecalciferol (VITAMIN D) 10 MCG (400 UNIT) CAPS Capsule Take 1 capsule by mouth    Christine Stone MD   betamethasone dipropionate 0.05 %

## 2025-02-27 ENCOUNTER — TELEPHONE (OUTPATIENT)
Age: 80
End: 2025-02-27

## 2025-02-27 RX ORDER — FLECAINIDE ACETATE 100 MG/1
100 TABLET ORAL 2 TIMES DAILY
Qty: 180 TABLET | Refills: 1 | Status: SHIPPED | OUTPATIENT
Start: 2025-02-27

## 2025-02-27 NOTE — TELEPHONE ENCOUNTER
Requested Prescriptions     Signed Prescriptions Disp Refills    flecainide (TAMBOCOR) 100 MG tablet 180 tablet 1     Sig: Take 1 tablet by mouth 2 times daily     Authorizing Provider: LUCA THOMAS     Ordering User: NEFTALI SINGH     Future Appointments   Date Time Provider Department Center   4/8/2025  9:00 AM [facility]   4/15/2025  9:20 AM Luca Thomas MD [facility]

## 2025-02-27 NOTE — TELEPHONE ENCOUNTER
Patient requested a refill on flecainide 100mg, it was denied by the pharmacy, patient stated he is not sure why, patient has been taking the medication for awhile.       Future Appointments   Date Time Provider Department Center   4/8/2025  9:00 AM [facility]   4/15/2025  9:20 AM Pj Garrido MD [facility] [phone]

## 2025-04-08 ENCOUNTER — ANCILLARY PROCEDURE (OUTPATIENT)
Age: 80
End: 2025-04-08
Payer: MEDICARE

## 2025-04-08 VITALS
BODY MASS INDEX: 24.86 KG/M2 | HEIGHT: 68 IN | HEART RATE: 65 BPM | DIASTOLIC BLOOD PRESSURE: 72 MMHG | WEIGHT: 164 LBS | SYSTOLIC BLOOD PRESSURE: 150 MMHG

## 2025-04-08 DIAGNOSIS — I34.0 NONRHEUMATIC MITRAL VALVE REGURGITATION: ICD-10-CM

## 2025-04-08 DIAGNOSIS — I35.0 AORTIC STENOSIS, MILD: ICD-10-CM

## 2025-04-08 DIAGNOSIS — I35.0 NONRHEUMATIC AORTIC (VALVE) STENOSIS: ICD-10-CM

## 2025-04-08 DIAGNOSIS — I49.9 IRREGULAR HEART BEAT: ICD-10-CM

## 2025-04-08 DIAGNOSIS — E78.2 MIXED HYPERLIPIDEMIA: ICD-10-CM

## 2025-04-08 DIAGNOSIS — I10 ESSENTIAL (PRIMARY) HYPERTENSION: ICD-10-CM

## 2025-04-08 DIAGNOSIS — I48.0 PAF (PAROXYSMAL ATRIAL FIBRILLATION) (HCC): ICD-10-CM

## 2025-04-08 PROCEDURE — 93306 TTE W/DOPPLER COMPLETE: CPT | Performed by: INTERNAL MEDICINE

## 2025-04-10 ENCOUNTER — RESULTS FOLLOW-UP (OUTPATIENT)
Age: 80
End: 2025-04-10

## 2025-04-10 LAB
ECHO AO ASC DIAM: 3.4 CM
ECHO AO ASCENDING AORTA INDEX: 1.81 CM/M2
ECHO AO ROOT DIAM: 3.6 CM
ECHO AO ROOT INDEX: 1.91 CM/M2
ECHO AV AREA PEAK VELOCITY: 1.3 CM2
ECHO AV AREA VTI: 1.3 CM2
ECHO AV AREA/BSA PEAK VELOCITY: 0.7 CM2/M2
ECHO AV AREA/BSA VTI: 0.7 CM2/M2
ECHO AV MEAN GRADIENT: 20 MMHG
ECHO AV MEAN VELOCITY: 2.1 M/S
ECHO AV PEAK GRADIENT: 38 MMHG
ECHO AV PEAK VELOCITY: 3.3 M/S
ECHO AV VELOCITY RATIO: 0.33
ECHO AV VTI: 71 CM
ECHO BSA: 1.89 M2
ECHO EST RA PRESSURE: 3 MMHG
ECHO LA DIAMETER INDEX: 2.29 CM/M2
ECHO LA DIAMETER: 4.3 CM
ECHO LA TO AORTIC ROOT RATIO: 1.19
ECHO LA VOL A-L A2C: 67 ML (ref 18–58)
ECHO LA VOL A-L A4C: 73 ML (ref 18–58)
ECHO LA VOL BP: 65 ML (ref 18–58)
ECHO LA VOL MOD A2C: 64 ML (ref 18–58)
ECHO LA VOL MOD A4C: 65 ML (ref 18–58)
ECHO LA VOL/BSA BIPLANE: 35 ML/M2 (ref 16–34)
ECHO LA VOLUME AREA LENGTH: 72 ML
ECHO LA VOLUME INDEX A-L A2C: 36 ML/M2 (ref 16–34)
ECHO LA VOLUME INDEX A-L A4C: 39 ML/M2 (ref 16–34)
ECHO LA VOLUME INDEX AREA LENGTH: 38 ML/M2 (ref 16–34)
ECHO LA VOLUME INDEX MOD A2C: 34 ML/M2 (ref 16–34)
ECHO LA VOLUME INDEX MOD A4C: 35 ML/M2 (ref 16–34)
ECHO LV E' LATERAL VELOCITY: 11.1 CM/S
ECHO LV E' SEPTAL VELOCITY: 8.69 CM/S
ECHO LV EDV A2C: 80 ML
ECHO LV EDV A4C: 96 ML
ECHO LV EDV BP: 92 ML (ref 67–155)
ECHO LV EDV INDEX A4C: 51 ML/M2
ECHO LV EDV INDEX BP: 49 ML/M2
ECHO LV EDV NDEX A2C: 43 ML/M2
ECHO LV EF PHYSICIAN: 60 %
ECHO LV EJECTION FRACTION A2C: 60 %
ECHO LV EJECTION FRACTION A4C: 59 %
ECHO LV EJECTION FRACTION BIPLANE: 59 % (ref 55–100)
ECHO LV ESV A2C: 32 ML
ECHO LV ESV A4C: 39 ML
ECHO LV ESV BP: 38 ML (ref 22–58)
ECHO LV ESV INDEX A2C: 17 ML/M2
ECHO LV ESV INDEX A4C: 21 ML/M2
ECHO LV ESV INDEX BP: 20 ML/M2
ECHO LV FRACTIONAL SHORTENING: 32 % (ref 28–44)
ECHO LV INTERNAL DIMENSION DIASTOLE INDEX: 2.34 CM/M2
ECHO LV INTERNAL DIMENSION DIASTOLIC: 4.4 CM (ref 4.2–5.9)
ECHO LV INTERNAL DIMENSION SYSTOLIC INDEX: 1.6 CM/M2
ECHO LV INTERNAL DIMENSION SYSTOLIC: 3 CM
ECHO LV IVSD: 1 CM (ref 0.6–1)
ECHO LV MASS 2D: 147.8 G (ref 88–224)
ECHO LV MASS INDEX 2D: 78.6 G/M2 (ref 49–115)
ECHO LV POSTERIOR WALL DIASTOLIC: 1 CM (ref 0.6–1)
ECHO LV RELATIVE WALL THICKNESS RATIO: 0.45
ECHO LVOT AREA: 3.5 CM2
ECHO LVOT AV VTI INDEX: 0.36
ECHO LVOT DIAM: 2.1 CM
ECHO LVOT MEAN GRADIENT: 3 MMHG
ECHO LVOT PEAK GRADIENT: 5 MMHG
ECHO LVOT PEAK VELOCITY: 1.1 M/S
ECHO LVOT STROKE VOLUME INDEX: 47.7 ML/M2
ECHO LVOT SV: 89.7 ML
ECHO LVOT VTI: 25.9 CM
ECHO MV A VELOCITY: 1.28 M/S
ECHO MV AREA VTI: 1.8 CM2
ECHO MV E DECELERATION TIME (DT): 273.7 MS
ECHO MV E VELOCITY: 1.78 M/S
ECHO MV E/A RATIO: 1.39
ECHO MV E/E' LATERAL: 16.04
ECHO MV E/E' RATIO (AVERAGED): 18.26
ECHO MV E/E' SEPTAL: 20.48
ECHO MV LVOT VTI INDEX: 1.87
ECHO MV MAX VELOCITY: 1.7 M/S
ECHO MV MEAN GRADIENT: 4 MMHG
ECHO MV MEAN VELOCITY: 0.8 M/S
ECHO MV PEAK GRADIENT: 11 MMHG
ECHO MV REGURGITANT PEAK GRADIENT: 112 MMHG
ECHO MV REGURGITANT PEAK VELOCITY: 5.3 M/S
ECHO MV VTI: 48.5 CM
ECHO PULMONARY ARTERY END DIASTOLIC PRESSURE: 3 MMHG
ECHO PV MAX VELOCITY: 1.3 M/S
ECHO PV PEAK GRADIENT: 6 MMHG
ECHO PV REGURGITANT MAX VELOCITY: 0.9 M/S
ECHO RIGHT VENTRICULAR SYSTOLIC PRESSURE (RVSP): 37 MMHG
ECHO RV BASAL DIMENSION: 3.8 CM
ECHO RV TAPSE: 2 CM (ref 1.7–?)
ECHO RVOT PEAK GRADIENT: 2 MMHG
ECHO RVOT PEAK VELOCITY: 0.7 M/S
ECHO TV REGURGITANT MAX VELOCITY: 2.93 M/S
ECHO TV REGURGITANT PEAK GRADIENT: 34 MMHG

## 2025-04-10 NOTE — RESULT ENCOUNTER NOTE
Elvia-I will discuss this echo with Mr. Brock at follow-up appointment in the future.  If he is to call or inquire in the meantime, please advise the patient that his heart function remains normal, and his aortic valve stenosis (flow limitation through a calcified valve) remains stable at the low end of moderate.  The mitral valve also has mild abnormalities that I do not expect to cause problems.  We will likely repeat an echo in about a year.    Future Appointments  4/15/2025  9:20 AM    Pj Garrido MD [facility]

## 2025-04-15 ENCOUNTER — OFFICE VISIT (OUTPATIENT)
Age: 80
End: 2025-04-15
Payer: MEDICARE

## 2025-04-15 VITALS
HEIGHT: 68 IN | HEART RATE: 71 BPM | DIASTOLIC BLOOD PRESSURE: 70 MMHG | SYSTOLIC BLOOD PRESSURE: 138 MMHG | WEIGHT: 166 LBS | BODY MASS INDEX: 25.16 KG/M2 | OXYGEN SATURATION: 99 %

## 2025-04-15 DIAGNOSIS — I10 ESSENTIAL (PRIMARY) HYPERTENSION: ICD-10-CM

## 2025-04-15 DIAGNOSIS — E78.2 MIXED HYPERLIPIDEMIA: ICD-10-CM

## 2025-04-15 DIAGNOSIS — I34.0 NONRHEUMATIC MITRAL VALVE REGURGITATION: ICD-10-CM

## 2025-04-15 DIAGNOSIS — I49.9 IRREGULAR HEART BEAT: ICD-10-CM

## 2025-04-15 DIAGNOSIS — I35.0 NONRHEUMATIC AORTIC (VALVE) STENOSIS: ICD-10-CM

## 2025-04-15 DIAGNOSIS — I48.0 PAROXYSMAL ATRIAL FIBRILLATION (HCC): Primary | ICD-10-CM

## 2025-04-15 DIAGNOSIS — I35.0 AORTIC STENOSIS, MILD: ICD-10-CM

## 2025-04-15 PROCEDURE — G8419 CALC BMI OUT NRM PARAM NOF/U: HCPCS | Performed by: INTERNAL MEDICINE

## 2025-04-15 PROCEDURE — 1126F AMNT PAIN NOTED NONE PRSNT: CPT | Performed by: INTERNAL MEDICINE

## 2025-04-15 PROCEDURE — 93010 ELECTROCARDIOGRAM REPORT: CPT | Performed by: INTERNAL MEDICINE

## 2025-04-15 PROCEDURE — 3075F SYST BP GE 130 - 139MM HG: CPT | Performed by: INTERNAL MEDICINE

## 2025-04-15 PROCEDURE — 99214 OFFICE O/P EST MOD 30 MIN: CPT | Performed by: INTERNAL MEDICINE

## 2025-04-15 PROCEDURE — G8427 DOCREV CUR MEDS BY ELIG CLIN: HCPCS | Performed by: INTERNAL MEDICINE

## 2025-04-15 PROCEDURE — 1123F ACP DISCUSS/DSCN MKR DOCD: CPT | Performed by: INTERNAL MEDICINE

## 2025-04-15 PROCEDURE — 1036F TOBACCO NON-USER: CPT | Performed by: INTERNAL MEDICINE

## 2025-04-15 PROCEDURE — G2211 COMPLEX E/M VISIT ADD ON: HCPCS | Performed by: INTERNAL MEDICINE

## 2025-04-15 PROCEDURE — 1159F MED LIST DOCD IN RCRD: CPT | Performed by: INTERNAL MEDICINE

## 2025-04-15 PROCEDURE — 1160F RVW MEDS BY RX/DR IN RCRD: CPT | Performed by: INTERNAL MEDICINE

## 2025-04-15 PROCEDURE — 93005 ELECTROCARDIOGRAM TRACING: CPT | Performed by: INTERNAL MEDICINE

## 2025-04-15 PROCEDURE — 3078F DIAST BP <80 MM HG: CPT | Performed by: INTERNAL MEDICINE

## 2025-04-15 RX ORDER — AMMONIUM LACTATE 12 G/100G
LOTION TOPICAL
COMMUNITY
Start: 2025-01-14

## 2025-04-15 RX ORDER — SILDENAFIL CITRATE 20 MG/1
20 TABLET ORAL PRN
COMMUNITY
Start: 2024-09-19

## 2025-04-15 ASSESSMENT — PATIENT HEALTH QUESTIONNAIRE - PHQ9
SUM OF ALL RESPONSES TO PHQ QUESTIONS 1-9: 0
2. FEELING DOWN, DEPRESSED OR HOPELESS: NOT AT ALL
1. LITTLE INTEREST OR PLEASURE IN DOING THINGS: NOT AT ALL
SUM OF ALL RESPONSES TO PHQ QUESTIONS 1-9: 0

## 2025-04-15 NOTE — PROGRESS NOTES
[address] [place] [address] [phone]    [address] [place] [address]     Subjective:        Ryan Brock is a 79 y.o. male is here for routine f/u.  The patient denies chest pain/ shortness of breath, orthopnea, PND, LE edema, palpitations, syncope, presyncope or fatigue.    Patient Active Problem List    Diagnosis Date Noted    Senile purpura 11/10/2022    Secondary hypercoagulable state 11/10/2022    BCC (basal cell carcinoma of skin)     Irregular heart beat 09/21/2016    Aortic stenosis, mild 02/25/2016    PAF (paroxysmal atrial fibrillation) (HCC) 02/25/2016    Mitral regurgitation 07/06/2015    Colon polyps 03/13/2015    Mixed hyperlipidemia 05/17/2012    Atrial flutter (HCC) 03/06/2012    Atrial fibrillation with RVR (HCC) 01/09/2012    Anemia 07/20/2011    Hyperlipidemia 07/19/2011    BPH (benign prostatic hyperplasia) 07/19/2011      Chetan Larsen MD  Past Medical History:   Diagnosis Date    Anemia 7/20/2011    Arrhythmia     A-flutter    Atrial fibrillation (HCC)     BCC (basal cell carcinoma of skin)     BPH (benign prostatic hyperplasia) 7/19/2011    Colon polyps 3/13/2015    Tubular adenomas    Essential hypertension     Fracture, ribs     from a fall    Hyperlipidemia 7/19/2011    Long term current use of anticoagulant therapy     Murmur     mitral regurgitation, aortic stenosis    Other acute and subacute form of ischemic heart disease     Palpitations     Valvular heart disease       Past Surgical History:   Procedure Laterality Date    BUNIONECTOMY Right 2011    CARDIAC CATH PROCEDURE  2/2/2012         CARDIAC ELECTROPHYSIOLOGY MAPPING AND ABLATION  2012    HAND SURGERY Left 02/11/2025    LEFT RING AND SMALL FINGERS SUBTOTAL FASCIECTOMY WITH DUPUYTRENS CONTRACTURE RELEASE (MAC WITH REGIONAL BLOCK) - Left    HERNIA REPAIR  12/07/2016    RIHR WITH MESH    ORTHOPEDIC SURGERY  2011    left bunionectomy and hammertoe repair    NM UNLISTED

## 2025-04-23 RX ORDER — LOSARTAN POTASSIUM 25 MG/1
25 TABLET ORAL DAILY
Qty: 90 TABLET | Refills: 3 | Status: SHIPPED | OUTPATIENT
Start: 2025-04-23

## 2025-04-23 NOTE — TELEPHONE ENCOUNTER
Refill Request Received for the Following Medication     Requested Prescriptions     Pending Prescriptions Disp Refills    losartan (COZAAR) 25 MG tablet [Pharmacy Med Name: LOSARTAN POTASSIUM 25 MG TAB] 90 tablet 3     Sig: TAKE 1 TABLET BY MOUTH EVERY DAY     Last Prescribed:04-    Last Appointment With Me:  4/15/2025     Future Appointments:  Future Appointments   Date Time Provider Department Center   10/14/2025  9:20 AM Pj Garrido MD [facility]

## 2025-05-21 RX ORDER — DILTIAZEM HYDROCHLORIDE 180 MG/1
CAPSULE, COATED, EXTENDED RELEASE ORAL DAILY
Qty: 90 CAPSULE | Refills: 3 | Status: SHIPPED | OUTPATIENT
Start: 2025-05-21

## 2025-05-21 NOTE — TELEPHONE ENCOUNTER
Refill Request Received for the Following Medication     Requested Prescriptions     Pending Prescriptions Disp Refills    dilTIAZem (CARDIZEM CD) 180 MG extended release capsule [Pharmacy Med Name: DILTIAZEM 24H ER(CD) 180 MG CP] 90 capsule 2     Sig: TAKE 1 CAPSULE BY MOUTH EVERY DAY     Last Prescribed: 08-    Last Appointment With Me:  4/15/2025     Future Appointments:  Future Appointments   Date Time Provider Department Center   10/14/2025  9:20 AM Pj Garrido MD [facility]

## 2025-06-26 ENCOUNTER — TELEPHONE (OUTPATIENT)
Age: 80
End: 2025-06-26

## 2025-06-26 NOTE — TELEPHONE ENCOUNTER
Patient is calling to inform nurse that pharmacy wanted to know if him taking Zithromax (z-herman) that was prescribed by his PCP may interfere with Flecainide and wanted to know if it was ok take it. He would like a call back to clarify.    Pt ph# [phone]    [facility]/PHARMACY [phone]

## 2025-06-26 NOTE — TELEPHONE ENCOUNTER
Consulted Cait DIAS NP about possible interaction between azithromycin and Flecainide. Nurse practitioner recommended for PCP to order a different antibiotic.  Pharmacy informed.

## 2025-07-18 RX ORDER — FLECAINIDE ACETATE 100 MG/1
100 TABLET ORAL 2 TIMES DAILY
Qty: 180 TABLET | Refills: 1 | Status: SHIPPED | OUTPATIENT
Start: 2025-07-18

## 2025-07-18 NOTE — TELEPHONE ENCOUNTER
Refill Request Received for the Following Medication     Requested Prescriptions     Pending Prescriptions Disp Refills    flecainide (TAMBOCOR) 100 MG tablet [Pharmacy Med Name: FLECAINIDE ACETATE 100 MG TAB] 180 tablet 1     Sig: TAKE 1 TABLET BY MOUTH TWICE A DAY     Last Prescribed: 02-    Last Appointment With Me:  4/15/2025     Future Appointments:  Future Appointments   Date Time Provider Department Center   10/14/2025  9:20 AM Pj Garrido MD [facility]

## (undated) DEVICE — BLADE OPHTH 180DEG CUT SURF BLU STR SHRP DBL BVL GRINDLESS

## (undated) DEVICE — SOLUTION IV 500ML 0.9% SOD BOTTLE CHL LTWT DURABLE SHATTERPROOF

## (undated) DEVICE — ZIMMER® STERILE DISPOSABLE TOURNIQUET CUFF WITH PROTECTIVE SLEEVE AND PLC, DUAL PORT, SINGLE BLADDER, 18 IN. (46 CM)

## (undated) DEVICE — GLOVE SURG SZ 75 L12IN FNGR THK94MIL STD WHT LTX FREE

## (undated) DEVICE — GLOVE ORANGE PI 7   MSG9070

## (undated) DEVICE — DRESSING GZ FLUF 36X36 IN RND 2 PLY PD GZ AMER WHT CROSS

## (undated) DEVICE — SYRINGE MEDICAL 3ML CLEAR PLASTIC STANDARD NON CONTROL LUERLOCK TIP DISPOSABLE

## (undated) DEVICE — BANDAGE COMPR W3INXL5YD TAN POLY COHESIVE CARING SGL

## (undated) DEVICE — DRESSING PETRO W3XL3IN OIL EMUL N ADH GZ KNIT IMPREG CELOS

## (undated) DEVICE — Device

## (undated) DEVICE — GLOVE SURG SZ 7 L12IN FNGR THK79MIL GRN LTX FREE

## (undated) DEVICE — HAND-SFMCASU: Brand: MEDLINE INDUSTRIES, INC.

## (undated) DEVICE — SUTURE N ABSRB L 18 IN SZ 4-0 NDL L 19 MM NYL MONOFILAMENT

## (undated) DEVICE — SPLINT CAST W4XL15IN GRN STRENGTH PLSTR OF PARIS FAST SET

## (undated) DEVICE — SOLUTION IRRIG 1000ML H2O PIC PLAS SHATTERPROOF CONTAINER